# Patient Record
Sex: MALE | Race: WHITE | Employment: OTHER | URBAN - METROPOLITAN AREA
[De-identification: names, ages, dates, MRNs, and addresses within clinical notes are randomized per-mention and may not be internally consistent; named-entity substitution may affect disease eponyms.]

---

## 2018-02-01 ENCOUNTER — OFFICE VISIT (OUTPATIENT)
Dept: FAMILY MEDICINE CLINIC | Age: 63
End: 2018-02-01

## 2018-02-01 VITALS
TEMPERATURE: 98.4 F | DIASTOLIC BLOOD PRESSURE: 76 MMHG | BODY MASS INDEX: 29.93 KG/M2 | SYSTOLIC BLOOD PRESSURE: 122 MMHG | HEIGHT: 71 IN | WEIGHT: 213.8 LBS | OXYGEN SATURATION: 96 %

## 2018-02-01 DIAGNOSIS — J40 BRONCHITIS: Primary | ICD-10-CM

## 2018-02-01 PROCEDURE — G8419 CALC BMI OUT NRM PARAM NOF/U: HCPCS | Performed by: FAMILY MEDICINE

## 2018-02-01 PROCEDURE — 1036F TOBACCO NON-USER: CPT | Performed by: FAMILY MEDICINE

## 2018-02-01 PROCEDURE — 3017F COLORECTAL CA SCREEN DOC REV: CPT | Performed by: FAMILY MEDICINE

## 2018-02-01 PROCEDURE — G8427 DOCREV CUR MEDS BY ELIG CLIN: HCPCS | Performed by: FAMILY MEDICINE

## 2018-02-01 PROCEDURE — 99203 OFFICE O/P NEW LOW 30 MIN: CPT | Performed by: FAMILY MEDICINE

## 2018-02-01 PROCEDURE — G8484 FLU IMMUNIZE NO ADMIN: HCPCS | Performed by: FAMILY MEDICINE

## 2018-02-01 RX ORDER — DOXYCYCLINE HYCLATE 100 MG
100 TABLET ORAL 2 TIMES DAILY
Qty: 20 TABLET | Refills: 0 | Status: SHIPPED | OUTPATIENT
Start: 2018-02-01 | End: 2018-02-11

## 2018-02-01 RX ORDER — BENZONATATE 200 MG/1
200 CAPSULE ORAL 3 TIMES DAILY PRN
Qty: 30 CAPSULE | Refills: 0 | Status: SHIPPED | OUTPATIENT
Start: 2018-02-01 | End: 2018-02-08

## 2018-02-01 ASSESSMENT — ENCOUNTER SYMPTOMS
SHORTNESS OF BREATH: 0
COUGH: 1
ABDOMINAL PAIN: 0
SINUS PRESSURE: 0
VOMITING: 0
DIARRHEA: 0
RHINORRHEA: 0
EYE DISCHARGE: 0
SORE THROAT: 0
WHEEZING: 1
CONSTIPATION: 0
BLOOD IN STOOL: 0
EYE PAIN: 0
CHEST TIGHTNESS: 0

## 2018-02-01 NOTE — PROGRESS NOTES
Subjective:      Patient ID: Zelda Holstein is a 65 y.o. male. HPI  Chief Complaint   Patient presents with    New Patient     Patient is here to establish care with Dr. Caterina Todd as a new patient.  Cough     Patient presents with a cough today, symptoms began a few days ago, cough is productive some of the time. Here to establish care. Non smoker. Last saw doctor  Was over 20 years. Denies smoking  Has cough and wheezing x 1 week. No fever. + bodyaches. No flu shot. No otc meds  + prod colored sputum  Zelda Holstein is a 65 y.o. male with the following history as recorded in [employer]: There are no active problems to display for this patient. No current outpatient prescriptions on file. No current facility-administered medications for this visit. Allergies: Review of patient's allergies indicates no known allergies. History reviewed. No pertinent past medical history. History reviewed. No pertinent surgical history. Family History   Problem Relation Age of Onset    Diabetes Brother     Dementia Mother     Diabetes Maternal Grandfather     Diabetes Brother      Social History   Substance Use Topics    Smoking status: Former Smoker     Years: 20.00     Types: Cigarettes    Smokeless tobacco: Never Used    Alcohol use No     Vitals:    02/01/18 1356   BP: 122/76   Site: Left Arm   Position: Sitting   Temp: 98.4 °F (36.9 °C)   TempSrc: Oral   SpO2: 96%   Weight: 213 lb 12.8 oz (97 kg)   Height: 5' 11\" (1.803 m)     Body mass index is 29.82 kg/m². Wt Readings from Last 3 Encounters:   02/01/18 213 lb 12.8 oz (97 kg)   07/14/10 200 lb (90.7 kg)     BP Readings from Last 3 Encounters:   02/01/18 122/76   07/14/10 130/84        Review of Systems   Constitutional: Negative for chills, fatigue, fever and unexpected weight change. HENT: Negative for congestion, ear discharge, ear pain, hearing loss, postnasal drip, rhinorrhea, sinus pressure and sore throat.     Eyes: Negative for pain, (TESSALON) 200 MG capsule     Sig: Take 1 capsule by mouth 3 times daily as needed for Cough     Dispense:  30 capsule     Refill:  0

## 2018-02-12 ENCOUNTER — OFFICE VISIT (OUTPATIENT)
Dept: FAMILY MEDICINE CLINIC | Age: 63
End: 2018-02-12

## 2018-02-12 VITALS
OXYGEN SATURATION: 98 % | SYSTOLIC BLOOD PRESSURE: 122 MMHG | WEIGHT: 207.6 LBS | DIASTOLIC BLOOD PRESSURE: 86 MMHG | HEIGHT: 69 IN | HEART RATE: 72 BPM | BODY MASS INDEX: 30.75 KG/M2

## 2018-02-12 DIAGNOSIS — Z13.220 SCREENING, LIPID: ICD-10-CM

## 2018-02-12 DIAGNOSIS — Z23 NEED FOR DIPHTHERIA-TETANUS-PERTUSSIS (TDAP) VACCINE: ICD-10-CM

## 2018-02-12 DIAGNOSIS — Z13.1 SCREENING FOR DIABETES MELLITUS (DM): ICD-10-CM

## 2018-02-12 DIAGNOSIS — Z11.4 SCREENING FOR HIV WITHOUT PRESENCE OF RISK FACTORS: ICD-10-CM

## 2018-02-12 DIAGNOSIS — Z12.5 SCREENING PSA (PROSTATE SPECIFIC ANTIGEN): ICD-10-CM

## 2018-02-12 DIAGNOSIS — L72.9 SKIN CYST: ICD-10-CM

## 2018-02-12 DIAGNOSIS — Z11.59 ENCOUNTER FOR HEPATITIS C SCREENING TEST FOR LOW RISK PATIENT: ICD-10-CM

## 2018-02-12 DIAGNOSIS — Z12.11 SCREEN FOR COLON CANCER: ICD-10-CM

## 2018-02-12 DIAGNOSIS — Z87.891 PERSONAL HISTORY OF TOBACCO USE: ICD-10-CM

## 2018-02-12 DIAGNOSIS — Z00.00 ANNUAL PHYSICAL EXAM: Primary | ICD-10-CM

## 2018-02-12 LAB
BILIRUBIN, POC: NEGATIVE
BLOOD URINE, POC: NEGATIVE
CHOLESTEROL, TOTAL: 190 MG/DL (ref 0–199)
CLARITY, POC: NORMAL
COLOR, POC: NORMAL
GLUCOSE BLD-MCNC: 122 MG/DL (ref 70–99)
GLUCOSE URINE, POC: NEGATIVE
HDLC SERPL-MCNC: 34 MG/DL (ref 40–60)
KETONES, POC: NEGATIVE
LDL CHOLESTEROL CALCULATED: 123 MG/DL
LEUKOCYTE EST, POC: NEGATIVE
NITRITE, POC: NEGATIVE
PH, POC: 5.5
PROSTATE SPECIFIC ANTIGEN: 0.28 NG/ML (ref 0–4)
PROTEIN, POC: NEGATIVE
SPECIFIC GRAVITY, POC: 1.02
TRIGL SERPL-MCNC: 163 MG/DL (ref 0–150)
UROBILINOGEN, POC: 0.2
VLDLC SERPL CALC-MCNC: 33 MG/DL

## 2018-02-12 PROCEDURE — 90715 TDAP VACCINE 7 YRS/> IM: CPT | Performed by: FAMILY MEDICINE

## 2018-02-12 PROCEDURE — 99396 PREV VISIT EST AGE 40-64: CPT | Performed by: FAMILY MEDICINE

## 2018-02-12 PROCEDURE — 36415 COLL VENOUS BLD VENIPUNCTURE: CPT | Performed by: FAMILY MEDICINE

## 2018-02-12 PROCEDURE — 81002 URINALYSIS NONAUTO W/O SCOPE: CPT | Performed by: FAMILY MEDICINE

## 2018-02-12 PROCEDURE — G0296 VISIT TO DETERM LDCT ELIG: HCPCS | Performed by: FAMILY MEDICINE

## 2018-02-12 PROCEDURE — 90471 IMMUNIZATION ADMIN: CPT | Performed by: FAMILY MEDICINE

## 2018-02-12 ASSESSMENT — ENCOUNTER SYMPTOMS
EYE PAIN: 0
CHEST TIGHTNESS: 0
SHORTNESS OF BREATH: 0
EYE DISCHARGE: 0
DIARRHEA: 0
CONSTIPATION: 0
COLOR CHANGE: 0
ABDOMINAL PAIN: 0
SINUS PRESSURE: 0
WHEEZING: 0
VOMITING: 0
RHINORRHEA: 0
COUGH: 0
BLOOD IN STOOL: 0

## 2018-02-12 ASSESSMENT — PATIENT HEALTH QUESTIONNAIRE - PHQ9
SUM OF ALL RESPONSES TO PHQ QUESTIONS 1-9: 0
1. LITTLE INTEREST OR PLEASURE IN DOING THINGS: 0
SUM OF ALL RESPONSES TO PHQ9 QUESTIONS 1 & 2: 0
2. FEELING DOWN, DEPRESSED OR HOPELESS: 0

## 2018-02-13 LAB
HEPATITIS C ANTIBODY INTERPRETATION: REACTIVE
HIV AG/AB: NORMAL
HIV ANTIGEN: NORMAL
HIV-1 ANTIBODY: NORMAL
HIV-2 AB: NORMAL

## 2018-02-14 NOTE — PROGRESS NOTES
Patient was advised of results and voiced understanding. He is scheduled to return on Friday to discuss his results.
Leukocytes, UA negative     Nitrite, UA negative        Review of Systems   Constitutional: Negative for fatigue, fever and unexpected weight change. HENT: Negative for congestion, ear discharge, ear pain, hearing loss, rhinorrhea and sinus pressure. Eyes: Negative for pain, discharge and visual disturbance. Respiratory: Negative for cough, chest tightness, shortness of breath and wheezing. Cardiovascular: Negative for chest pain, palpitations and leg swelling. Gastrointestinal: Negative for abdominal pain, blood in stool, constipation, diarrhea and vomiting. Genitourinary: Negative for difficulty urinating, dysuria and hematuria. Musculoskeletal: Negative for arthralgias and myalgias. Skin: Negative for color change and rash. Neurological: Negative for dizziness, seizures, syncope and headaches. Hematological: Negative for adenopathy. Does not bruise/bleed easily. Psychiatric/Behavioral: Negative for dysphoric mood and sleep disturbance. The patient is not nervous/anxious. Objective:   Physical Exam   Constitutional: He is oriented to person, place, and time. He appears well-developed and well-nourished. No distress. HENT:   Head: Normocephalic. Right Ear: External ear normal.   Left Ear: External ear normal.   Nose: Nose normal.   Mouth/Throat: Oropharynx is clear and moist. No oropharyngeal exudate. Eyes: Conjunctivae and EOM are normal. Pupils are equal, round, and reactive to light. No scleral icterus. Neck: Normal range of motion. Neck supple. No thyromegaly present. Cardiovascular: Normal rate, regular rhythm, normal heart sounds and intact distal pulses. No murmur heard. Pulmonary/Chest: Effort normal and breath sounds normal. He has no wheezes. Abdominal: Soft. Bowel sounds are normal. He exhibits no distension. There is no tenderness. There is no rebound and no guarding. Genitourinary: Penis normal. No penile tenderness.    Genitourinary Comments: BETITO- full

## 2018-02-16 ENCOUNTER — OFFICE VISIT (OUTPATIENT)
Dept: FAMILY MEDICINE CLINIC | Age: 63
End: 2018-02-16

## 2018-02-16 VITALS
BODY MASS INDEX: 31.28 KG/M2 | DIASTOLIC BLOOD PRESSURE: 78 MMHG | HEIGHT: 69 IN | OXYGEN SATURATION: 97 % | HEART RATE: 78 BPM | SYSTOLIC BLOOD PRESSURE: 126 MMHG | WEIGHT: 211.2 LBS

## 2018-02-16 DIAGNOSIS — R76.8 HEPATITIS C ANTIBODY TEST POSITIVE: Primary | ICD-10-CM

## 2018-02-16 DIAGNOSIS — R73.9 HYPERGLYCEMIA: ICD-10-CM

## 2018-02-16 PROBLEM — Z87.891 FORMER SMOKER: Status: ACTIVE | Noted: 2018-02-16

## 2018-02-16 PROBLEM — Z00.00 ANNUAL PHYSICAL EXAM: Status: ACTIVE | Noted: 2018-02-16

## 2018-02-16 LAB
A/G RATIO: 1.3 (ref 1.1–2.2)
ALBUMIN SERPL-MCNC: 4 G/DL (ref 3.4–5)
ALP BLD-CCNC: 61 U/L (ref 40–129)
ALT SERPL-CCNC: 16 U/L (ref 10–40)
ANION GAP SERPL CALCULATED.3IONS-SCNC: 13 MMOL/L (ref 3–16)
AST SERPL-CCNC: 16 U/L (ref 15–37)
BASOPHILS ABSOLUTE: 0.1 K/UL (ref 0–0.2)
BASOPHILS RELATIVE PERCENT: 0.7 %
BILIRUB SERPL-MCNC: 0.6 MG/DL (ref 0–1)
BUN BLDV-MCNC: 16 MG/DL (ref 7–20)
CALCIUM SERPL-MCNC: 9.2 MG/DL (ref 8.3–10.6)
CHLORIDE BLD-SCNC: 104 MMOL/L (ref 99–110)
CO2: 25 MMOL/L (ref 21–32)
CREAT SERPL-MCNC: 1 MG/DL (ref 0.8–1.3)
EOSINOPHILS ABSOLUTE: 0.5 K/UL (ref 0–0.6)
EOSINOPHILS RELATIVE PERCENT: 4.9 %
GFR AFRICAN AMERICAN: >60
GFR NON-AFRICAN AMERICAN: >60
GLOBULIN: 3.2 G/DL
GLUCOSE BLD-MCNC: 123 MG/DL (ref 70–99)
HCT VFR BLD CALC: 48.8 % (ref 40.5–52.5)
HEMOGLOBIN: 16.4 G/DL (ref 13.5–17.5)
LYMPHOCYTES ABSOLUTE: 3.3 K/UL (ref 1–5.1)
LYMPHOCYTES RELATIVE PERCENT: 33.2 %
MCH RBC QN AUTO: 30.4 PG (ref 26–34)
MCHC RBC AUTO-ENTMCNC: 33.6 G/DL (ref 31–36)
MCV RBC AUTO: 90.4 FL (ref 80–100)
MONOCYTES ABSOLUTE: 1.3 K/UL (ref 0–1.3)
MONOCYTES RELATIVE PERCENT: 13.7 %
NEUTROPHILS ABSOLUTE: 4.7 K/UL (ref 1.7–7.7)
NEUTROPHILS RELATIVE PERCENT: 47.5 %
PDW BLD-RTO: 13.9 % (ref 12.4–15.4)
PLATELET # BLD: 282 K/UL (ref 135–450)
PMV BLD AUTO: 11.1 FL (ref 5–10.5)
POTASSIUM SERPL-SCNC: 4.9 MMOL/L (ref 3.5–5.1)
RBC # BLD: 5.4 M/UL (ref 4.2–5.9)
SODIUM BLD-SCNC: 142 MMOL/L (ref 136–145)
TOTAL PROTEIN: 7.2 G/DL (ref 6.4–8.2)
TSH REFLEX: 0.86 UIU/ML (ref 0.27–4.2)
WBC # BLD: 9.9 K/UL (ref 4–11)

## 2018-02-16 PROCEDURE — 36415 COLL VENOUS BLD VENIPUNCTURE: CPT | Performed by: FAMILY MEDICINE

## 2018-02-16 PROCEDURE — G8427 DOCREV CUR MEDS BY ELIG CLIN: HCPCS | Performed by: FAMILY MEDICINE

## 2018-02-16 PROCEDURE — 99213 OFFICE O/P EST LOW 20 MIN: CPT | Performed by: FAMILY MEDICINE

## 2018-02-16 PROCEDURE — G8484 FLU IMMUNIZE NO ADMIN: HCPCS | Performed by: FAMILY MEDICINE

## 2018-02-16 PROCEDURE — G8417 CALC BMI ABV UP PARAM F/U: HCPCS | Performed by: FAMILY MEDICINE

## 2018-02-16 PROCEDURE — 3017F COLORECTAL CA SCREEN DOC REV: CPT | Performed by: FAMILY MEDICINE

## 2018-02-16 PROCEDURE — 1036F TOBACCO NON-USER: CPT | Performed by: FAMILY MEDICINE

## 2018-02-16 NOTE — PATIENT INSTRUCTIONS

## 2018-02-16 NOTE — PROGRESS NOTES
Subjective:      Patient ID: Kodi Dominguez is a 65 y.o. male. HPI   Chief Complaint   Patient presents with   [facility]     Patient is here to discuss his most recent test results. Here for f/u  Family h/o DM  Does eat a lot of pasta and rice  Denies excessive sugar  No known risk factory for hep c but sister in law had it  Vitals:    02/16/18 0806   BP: 126/78   Pulse: 78   SpO2: 97%   Weight: 211 lb 3.2 oz (95.8 kg)   Height: 5' 8.5\" (1.74 m)     Body mass index is 31.65 kg/m². Wt Readings from Last 3 Encounters:   02/16/18 211 lb 3.2 oz (95.8 kg)   02/12/18 207 lb 9.6 oz (94.2 kg)   02/01/18 213 lb 12.8 oz (97 kg)     BP Readings from Last 3 Encounters:   02/16/18 126/78   02/12/18 122/86   02/01/18 122/76            Lab Review   Office Visit on 02/12/2018   Component Date Value    Glucose, UA POC 02/12/2018 negative     Bilirubin, UA 02/12/2018 negative     Ketones, UA 02/12/2018 negative     Spec Grav, UA 02/12/2018 1.020     Blood, UA POC 02/12/2018 negative     pH, UA 02/12/2018 5.5     Protein, UA POC 02/12/2018 negative     Urobilinogen, UA 02/12/2018 0.2     Leukocytes, UA 02/12/2018 negative     Nitrite, UA 02/12/2018 negative     Cholesterol, Total 02/12/2018 190     Triglycerides 02/12/2018 163*    HDL 02/12/2018 34*    LDL Calculated 02/12/2018 123*    VLDL Cholesterol Calcula* 02/12/2018 33     PSA 02/12/2018 0.28     Glucose 02/12/2018 122*    Hep C Ab Interp 02/13/2018 REACTIVE*    HIV Ag/Ab 02/13/2018 Non-Reactive     HIV-1 Antibody 02/13/2018 Non-Reactive     HIV ANTIGEN 02/13/2018 Non-Reactive     HIV-2 Ab 02/13/2018 Non-Reactive        Review of Systems   All other systems reviewed and are negative. Objective:   Physical Exam   Constitutional: He is oriented to person, place, and time. He appears well-developed and well-nourished. No distress. HENT:   Head: Normocephalic. Mouth/Throat: Oropharynx is clear and moist. No oropharyngeal exudate.    Eyes: EOM are normal.   Neck: Neck supple. Cardiovascular: Normal rate, regular rhythm, normal heart sounds and intact distal pulses. No murmur heard. Pulmonary/Chest: Effort normal and breath sounds normal. He has no wheezes. Abdominal: Soft. Bowel sounds are normal. He exhibits no distension. There is no tenderness. There is no rebound and no guarding. Neurological: He is oriented to person, place, and time. Skin: Skin is warm.    Psychiatric: His behavior is normal.       Assessment:      Hyperglycemia- check a1c, tsh, cbc  Hep c + - check addtl labs      Plan:       Reviewed labs w/ pt  Diet and exercise    Orders Placed This Encounter   Procedures    HEMOGLOBIN A1C    HEPATITIS C RNA, QUANTITATIVE, PCR    COMPREHENSIVE METABOLIC PANEL    TSH with Reflex    CBC WITH AUTO DIFFERENTIAL

## 2018-02-17 LAB
ESTIMATED AVERAGE GLUCOSE: 128.4 MG/DL
HBA1C MFR BLD: 6.1 %

## 2018-02-21 LAB
EER HCV RNA QNT PCR: NORMAL
HCV RNA QNT REAL-TIME PCR INTERP: NOT DETECTED
HCV RNA, QUANTITATIVE REAL TIME PCR: <1.2 LOG IU
HEPATITIS C RNA PCR QUANT: <15 IU/ML

## 2018-03-01 ENCOUNTER — OFFICE VISIT (OUTPATIENT)
Dept: FAMILY MEDICINE CLINIC | Age: 63
End: 2018-03-01

## 2018-03-01 VITALS
DIASTOLIC BLOOD PRESSURE: 80 MMHG | WEIGHT: 216.4 LBS | HEIGHT: 69 IN | SYSTOLIC BLOOD PRESSURE: 132 MMHG | BODY MASS INDEX: 32.05 KG/M2 | OXYGEN SATURATION: 97 % | HEART RATE: 70 BPM

## 2018-03-01 DIAGNOSIS — R76.8 HEPATITIS C ANTIBODY TEST POSITIVE: ICD-10-CM

## 2018-03-01 DIAGNOSIS — R73.03 PREDIABETES: Primary | ICD-10-CM

## 2018-03-01 PROCEDURE — 1036F TOBACCO NON-USER: CPT | Performed by: FAMILY MEDICINE

## 2018-03-01 PROCEDURE — 3017F COLORECTAL CA SCREEN DOC REV: CPT | Performed by: FAMILY MEDICINE

## 2018-03-01 PROCEDURE — G8484 FLU IMMUNIZE NO ADMIN: HCPCS | Performed by: FAMILY MEDICINE

## 2018-03-01 PROCEDURE — G8417 CALC BMI ABV UP PARAM F/U: HCPCS | Performed by: FAMILY MEDICINE

## 2018-03-01 PROCEDURE — 99213 OFFICE O/P EST LOW 20 MIN: CPT | Performed by: FAMILY MEDICINE

## 2018-03-01 PROCEDURE — G8427 DOCREV CUR MEDS BY ELIG CLIN: HCPCS | Performed by: FAMILY MEDICINE

## 2018-03-01 NOTE — PROGRESS NOTES
Subjective:      Patient ID: Kodi Dominguez is a 65 y.o. male. HPI  Chief Complaint   Patient presents with   [employer]     Patient is here to discuss his most recent labs with Dr. Olya Velásquez. Here for follow-up on labs. Patient still to schedule CT lung screen and colonoscopy. States would like to try diet and exercise to lower blood sugar. Vitals:    03/01/18 0934   BP: 132/80   Site: Right Arm   Pulse: 70   SpO2: 97%   Weight: 216 lb 6.4 oz (98.2 kg)   Height: 5' 8.5\" (1.74 m)     Body mass index is 32.42 kg/m².      Wt Readings from Last 3 Encounters:   03/01/18 216 lb 6.4 oz (98.2 kg)   02/16/18 211 lb 3.2 oz (95.8 kg)   02/12/18 207 lb 9.6 oz (94.2 kg)     BP Readings from Last 3 Encounters:   03/01/18 132/80   02/16/18 126/78   02/12/18 122/86      Lab Review   Office Visit on 02/16/2018   Component Date Value    Hemoglobin A1C 02/16/2018 6.1     eAG 02/16/2018 128.4     HCV RNA, Quantitative Re* 02/16/2018 <1.2     HCV RNA PCR, Quant 02/16/2018 <15     EER HCV RNA Quant, PCR 02/16/2018 See Note     HCV RNA Qnt Real-Time PC* 02/16/2018 Not Detected     Sodium 02/16/2018 142     Potassium 02/16/2018 4.9     Chloride 02/16/2018 104     CO2 02/16/2018 25     Anion Gap 02/16/2018 13     Glucose 02/16/2018 123*    BUN 02/16/2018 16     CREATININE 02/16/2018 1.0     GFR Non- 02/16/2018 >60     GFR  02/16/2018 >60     Calcium 02/16/2018 9.2     Total Protein 02/16/2018 7.2     Alb 02/16/2018 4.0     Albumin/Globulin Ratio 02/16/2018 1.3     Total Bilirubin 02/16/2018 0.6     Alkaline Phosphatase 02/16/2018 61     ALT 02/16/2018 16     AST 02/16/2018 16     Globulin 02/16/2018 3.2     TSH 02/16/2018 0.86     WBC 02/16/2018 9.9     RBC 02/16/2018 5.40     Hemoglobin 02/16/2018 16.4     Hematocrit 02/16/2018 48.8     MCV 02/16/2018 90.4     MCH 02/16/2018 30.4     MCHC 02/16/2018 33.6     RDW 02/16/2018 13.9     Platelets 61/20/9718 282     MPV 02/16/2018 11.1*    Neutrophils % 02/16/2018 47.5     Lymphocytes % 02/16/2018 33.2     Monocytes % 02/16/2018 13.7     Eosinophils % 02/16/2018 4.9     Basophils % 02/16/2018 0.7     Neutrophils # 02/16/2018 4.7     Lymphocytes # 02/16/2018 3.3     Monocytes # 02/16/2018 1.3     Eosinophils # 02/16/2018 0.5     Basophils # 02/16/2018 0.1    Office Visit on 02/12/2018   Component Date Value    Glucose, UA POC 02/12/2018 negative     Bilirubin, UA 02/12/2018 negative     Ketones, UA 02/12/2018 negative     Spec Grav, UA 02/12/2018 1.020     Blood, UA POC 02/12/2018 negative     pH, UA 02/12/2018 5.5     Protein, UA POC 02/12/2018 negative     Urobilinogen, UA 02/12/2018 0.2     Leukocytes, UA 02/12/2018 negative     Nitrite, UA 02/12/2018 negative     Cholesterol, Total 02/12/2018 190     Triglycerides 02/12/2018 163*    HDL 02/12/2018 34*    LDL Calculated 02/12/2018 123*    VLDL Cholesterol Calcula* 02/12/2018 33     PSA 02/12/2018 0.28     Glucose 02/12/2018 122*    Hep C Ab Interp 02/12/2018 REACTIVE*    HIV Ag/Ab 02/12/2018 Non-Reactive     HIV-1 Antibody 02/12/2018 Non-Reactive     HIV ANTIGEN 02/12/2018 Non-Reactive     HIV-2 Ab 02/12/2018 Non-Reactive        Review of Systems   All other systems reviewed and are negative. Objective:   Physical Exam   Constitutional: He is oriented to person, place, and time. He appears well-developed and well-nourished. No distress. HENT:   Head: Normocephalic. Mouth/Throat: Oropharynx is clear and moist. No oropharyngeal exudate. Neck: Neck supple. Cardiovascular: Normal rate, regular rhythm, normal heart sounds and intact distal pulses. No murmur heard. Pulmonary/Chest: Effort normal and breath sounds normal. He has no wheezes. Abdominal: Soft. Bowel sounds are normal. He exhibits no distension. There is no tenderness. There is no rebound and no guarding. Neurological: He is oriented to person, place, and time.    Skin:

## 2018-04-11 PROBLEM — Z00.00 ANNUAL PHYSICAL EXAM: Status: RESOLVED | Noted: 2018-02-16 | Resolved: 2018-04-11

## 2018-04-24 ENCOUNTER — HOSPITAL ENCOUNTER (OUTPATIENT)
Dept: DIABETES SERVICES | Age: 63
Discharge: HOME | End: 2018-04-30
Admitting: FAMILY MEDICINE

## 2018-04-24 ENCOUNTER — HOSPITAL ENCOUNTER (OUTPATIENT)
Dept: OTHER | Age: 63
Discharge: HOME | End: 2018-04-24
Attending: FAMILY MEDICINE | Admitting: FAMILY MEDICINE

## 2018-05-01 ENCOUNTER — HOSPITAL ENCOUNTER (OUTPATIENT)
Dept: OTHER | Age: 63
Discharge: HOME | End: 2018-05-31
Attending: FAMILY MEDICINE | Admitting: FAMILY MEDICINE

## 2018-09-18 ENCOUNTER — OFFICE VISIT (OUTPATIENT)
Dept: FAMILY MEDICINE CLINIC | Age: 63
End: 2018-09-18

## 2018-09-18 VITALS
OXYGEN SATURATION: 97 % | DIASTOLIC BLOOD PRESSURE: 82 MMHG | HEART RATE: 62 BPM | SYSTOLIC BLOOD PRESSURE: 128 MMHG | WEIGHT: 213 LBS | HEIGHT: 69 IN | BODY MASS INDEX: 31.55 KG/M2

## 2018-09-18 DIAGNOSIS — R76.8 HEPATITIS C ANTIBODY TEST POSITIVE: ICD-10-CM

## 2018-09-18 DIAGNOSIS — R73.03 PREDIABETES: ICD-10-CM

## 2018-09-18 DIAGNOSIS — R53.1 RIGHT SIDED WEAKNESS: Primary | ICD-10-CM

## 2018-09-18 PROCEDURE — G8417 CALC BMI ABV UP PARAM F/U: HCPCS | Performed by: FAMILY MEDICINE

## 2018-09-18 PROCEDURE — 3017F COLORECTAL CA SCREEN DOC REV: CPT | Performed by: FAMILY MEDICINE

## 2018-09-18 PROCEDURE — 99214 OFFICE O/P EST MOD 30 MIN: CPT | Performed by: FAMILY MEDICINE

## 2018-09-18 PROCEDURE — 1036F TOBACCO NON-USER: CPT | Performed by: FAMILY MEDICINE

## 2018-09-18 PROCEDURE — G8427 DOCREV CUR MEDS BY ELIG CLIN: HCPCS | Performed by: FAMILY MEDICINE

## 2018-09-18 PROCEDURE — 36415 COLL VENOUS BLD VENIPUNCTURE: CPT | Performed by: FAMILY MEDICINE

## 2018-09-18 ASSESSMENT — ENCOUNTER SYMPTOMS
SINUS PRESSURE: 0
ABDOMINAL PAIN: 0
EYE DISCHARGE: 0
EYE PAIN: 0
VOMITING: 0
BLOOD IN STOOL: 0
WHEEZING: 0
CONSTIPATION: 0
SHORTNESS OF BREATH: 0
CHEST TIGHTNESS: 0
COUGH: 0
RHINORRHEA: 0
DIARRHEA: 0

## 2018-09-19 ENCOUNTER — TELEPHONE (OUTPATIENT)
Dept: FAMILY MEDICINE CLINIC | Age: 63
End: 2018-09-19

## 2018-09-19 DIAGNOSIS — G45.9 TRANSIENT CEREBRAL ISCHEMIA, UNSPECIFIED TYPE: Primary | ICD-10-CM

## 2018-09-19 LAB
ESTIMATED AVERAGE GLUCOSE: 119.8 MG/DL
HBA1C MFR BLD: 5.8 %

## 2018-09-20 LAB
HCV QNT BY NAAT IU/ML: NOT DETECTED IU/ML
HCV QNT BY NAAT LOG IU/ML: NOT DETECTED LOG IU/ML

## 2018-09-25 ENCOUNTER — HOSPITAL ENCOUNTER (OUTPATIENT)
Age: 63
Discharge: HOME | End: 2018-09-25
Payer: COMMERCIAL

## 2018-09-25 ENCOUNTER — HOSPITAL ENCOUNTER (OUTPATIENT)
Dept: MRI IMAGING | Age: 63
Discharge: HOME | End: 2018-09-25
Payer: COMMERCIAL

## 2018-09-25 DIAGNOSIS — R53.1 RIGHT SIDED WEAKNESS: ICD-10-CM

## 2018-09-25 DIAGNOSIS — G45.9 TRANSIENT CEREBRAL ISCHEMIA, UNSPECIFIED TYPE: ICD-10-CM

## 2018-09-25 LAB
ANION GAP SERPL CALCULATED.3IONS-SCNC: 8 MMOL/L (ref 3–16)
BUN BLDV-MCNC: 18 MG/DL (ref 7–20)
CALCIUM SERPL-MCNC: 9 MG/DL (ref 8.3–10.6)
CHLORIDE BLD-SCNC: 103 MMOL/L (ref 99–110)
CO2: 30 MMOL/L (ref 21–32)
CREAT SERPL-MCNC: 1.1 MG/DL (ref 0.8–1.3)
GFR AFRICAN AMERICAN: >60
GFR NON-AFRICAN AMERICAN: >60
GLUCOSE BLD-MCNC: 100 MG/DL (ref 70–99)
POTASSIUM SERPL-SCNC: 4.7 MMOL/L (ref 3.5–5.1)
SODIUM BLD-SCNC: 141 MMOL/L (ref 136–145)

## 2018-09-25 PROCEDURE — 6360000004 HC RX CONTRAST MEDICATION: Performed by: FAMILY MEDICINE

## 2018-09-25 PROCEDURE — 80048 BASIC METABOLIC PNL TOTAL CA: CPT

## 2018-09-25 PROCEDURE — A9579 GAD-BASE MR CONTRAST NOS,1ML: HCPCS | Performed by: FAMILY MEDICINE

## 2018-09-25 PROCEDURE — 70553 MRI BRAIN STEM W/O & W/DYE: CPT

## 2018-09-25 PROCEDURE — 36415 COLL VENOUS BLD VENIPUNCTURE: CPT

## 2018-09-25 RX ADMIN — GADOTERIDOL 18 ML: 279.3 INJECTION, SOLUTION INTRAVENOUS at 09:20

## 2018-09-28 ENCOUNTER — OFFICE VISIT (OUTPATIENT)
Dept: FAMILY MEDICINE CLINIC | Age: 63
End: 2018-09-28
Payer: COMMERCIAL

## 2018-09-28 VITALS
WEIGHT: 213 LBS | HEART RATE: 90 BPM | DIASTOLIC BLOOD PRESSURE: 82 MMHG | SYSTOLIC BLOOD PRESSURE: 126 MMHG | OXYGEN SATURATION: 97 % | HEIGHT: 69 IN | BODY MASS INDEX: 31.55 KG/M2

## 2018-09-28 DIAGNOSIS — I63.9 CEREBROVASCULAR ACCIDENT (CVA), UNSPECIFIED MECHANISM (HCC): Primary | ICD-10-CM

## 2018-09-28 PROCEDURE — G8427 DOCREV CUR MEDS BY ELIG CLIN: HCPCS | Performed by: FAMILY MEDICINE

## 2018-09-28 PROCEDURE — G8417 CALC BMI ABV UP PARAM F/U: HCPCS | Performed by: FAMILY MEDICINE

## 2018-09-28 PROCEDURE — G8599 NO ASA/ANTIPLAT THER USE RNG: HCPCS | Performed by: FAMILY MEDICINE

## 2018-09-28 PROCEDURE — 93000 ELECTROCARDIOGRAM COMPLETE: CPT | Performed by: FAMILY MEDICINE

## 2018-09-28 PROCEDURE — 3017F COLORECTAL CA SCREEN DOC REV: CPT | Performed by: FAMILY MEDICINE

## 2018-09-28 PROCEDURE — 99214 OFFICE O/P EST MOD 30 MIN: CPT | Performed by: FAMILY MEDICINE

## 2018-09-28 PROCEDURE — 1036F TOBACCO NON-USER: CPT | Performed by: FAMILY MEDICINE

## 2018-09-28 RX ORDER — ATORVASTATIN CALCIUM 10 MG/1
10 TABLET, FILM COATED ORAL NIGHTLY
Qty: 30 TABLET | Refills: 3 | Status: SHIPPED | OUTPATIENT
Start: 2018-09-28 | End: 2020-02-17

## 2018-09-28 ASSESSMENT — ENCOUNTER SYMPTOMS
BLOOD IN STOOL: 0
CHEST TIGHTNESS: 0
VOMITING: 0
DIARRHEA: 0
WHEEZING: 0
SHORTNESS OF BREATH: 0
EYE PAIN: 0
EYE DISCHARGE: 0
RHINORRHEA: 0
SINUS PRESSURE: 0
ABDOMINAL PAIN: 0
CONSTIPATION: 0
COUGH: 0

## 2018-09-28 NOTE — PROGRESS NOTES
portion of the orbits demonstrate no acute abnormality.       SINUSES: There is a small left mastoid effusion.  The paranasal sinuses are   clear.       BONES/SOFT TISSUES: The bone marrow signal intensity appears normal. The soft   tissues demonstrate no acute abnormality.           Impression   Small acute to subacute punctate infarct within the left frontoparietal lobe. Mild enhancement is identified at this level compatible with granulation   tissue.       Mild chronic small vessel ischemic disease within the periventricular white   matter of both cerebral hemispheres. Lab Review         Review of Systems   Constitutional: Negative for fatigue, fever and unexpected weight change. HENT: Negative for congestion, ear discharge, ear pain, hearing loss, rhinorrhea and sinus pressure. Eyes: Negative for pain, discharge and visual disturbance. Respiratory: Negative for cough, chest tightness, shortness of breath and wheezing. Cardiovascular: Negative for chest pain, palpitations and leg swelling. Gastrointestinal: Negative for abdominal pain, blood in stool, constipation, diarrhea and vomiting. Genitourinary: Negative for difficulty urinating, dysuria and hematuria. Neurological: Negative for dizziness, seizures, syncope and headaches. Psychiatric/Behavioral: Negative for dysphoric mood and sleep disturbance. The patient is not nervous/anxious. Objective:   Physical Exam   Constitutional: He is oriented to person, place, and time. He appears well-developed and well-nourished. No distress. HENT:   Head: Normocephalic. Right Ear: External ear normal.   Left Ear: External ear normal.   Nose: Nose normal.   Mouth/Throat: Oropharynx is clear and moist. No oropharyngeal exudate. Eyes: Pupils are equal, round, and reactive to light. EOM are normal.   Neck: Neck supple. No thyromegaly present. Cardiovascular: Normal rate, regular rhythm, normal heart sounds and intact distal pulses.     No murmur heard. Pulmonary/Chest: Effort normal and breath sounds normal. He has no wheezes. Abdominal: Soft. Bowel sounds are normal. He exhibits no distension. There is no tenderness. There is no rebound and no guarding. Musculoskeletal: Normal range of motion. Lymphadenopathy:     He has no cervical adenopathy. Neurological: He is alert and oriented to person, place, and time. Skin: Skin is warm. Psychiatric: He has a normal mood and affect. His behavior is normal. Judgment and thought content normal.       Assessment:      CVA- ekg- NSR, get addtl tests      Plan:      rec statin  rec BP control- not elevated her- will monitor at home to get trend  Baby ASA daily  Orders Placed This Encounter   Procedures    MRA HEAD WO CONTRAST     Standing Status:   Future     Standing Expiration Date:   9/28/2019     Order Specific Question:   Reason for exam:     Answer:   stroke    MRA NECK W WO CONTRAST     Standing Status:   Future     Standing Expiration Date:   9/28/2019     Order Specific Question:   Reason for exam:     Answer:   stroke    EKG 12 Lead     Order Specific Question:   Reason for Exam?     Answer:    Other    ECHO Complete 2D W Doppler W Color     Standing Status:   Future     Standing Expiration Date:   11/28/2018     Order Specific Question:   Reason for exam:     Answer:   stroke     Orders Placed This Encounter   Medications    atorvastatin (LIPITOR) 10 MG tablet     Sig: Take 1 tablet by mouth nightly     Dispense:  30 tablet     Refill:  3             CHRISTOPHER OSCAR DO

## 2019-12-05 ENCOUNTER — OFFICE VISIT (OUTPATIENT)
Dept: FAMILY MEDICINE CLINIC | Age: 64
End: 2019-12-05
Payer: COMMERCIAL

## 2019-12-05 VITALS
HEIGHT: 69 IN | TEMPERATURE: 98.7 F | BODY MASS INDEX: 32.23 KG/M2 | WEIGHT: 217.6 LBS | OXYGEN SATURATION: 98 % | HEART RATE: 75 BPM | SYSTOLIC BLOOD PRESSURE: 122 MMHG | DIASTOLIC BLOOD PRESSURE: 78 MMHG

## 2019-12-05 DIAGNOSIS — J40 BRONCHITIS: Primary | ICD-10-CM

## 2019-12-05 PROCEDURE — G8417 CALC BMI ABV UP PARAM F/U: HCPCS | Performed by: FAMILY MEDICINE

## 2019-12-05 PROCEDURE — G8484 FLU IMMUNIZE NO ADMIN: HCPCS | Performed by: FAMILY MEDICINE

## 2019-12-05 PROCEDURE — 99213 OFFICE O/P EST LOW 20 MIN: CPT | Performed by: FAMILY MEDICINE

## 2019-12-05 PROCEDURE — 3017F COLORECTAL CA SCREEN DOC REV: CPT | Performed by: FAMILY MEDICINE

## 2019-12-05 PROCEDURE — 1036F TOBACCO NON-USER: CPT | Performed by: FAMILY MEDICINE

## 2019-12-05 PROCEDURE — G8427 DOCREV CUR MEDS BY ELIG CLIN: HCPCS | Performed by: FAMILY MEDICINE

## 2019-12-05 RX ORDER — DOXYCYCLINE HYCLATE 100 MG
100 TABLET ORAL 2 TIMES DAILY
Qty: 20 TABLET | Refills: 0 | Status: SHIPPED | OUTPATIENT
Start: 2019-12-05 | End: 2019-12-15

## 2019-12-05 ASSESSMENT — ENCOUNTER SYMPTOMS
SINUS PRESSURE: 1
SHORTNESS OF BREATH: 0
SORE THROAT: 1
CHEST TIGHTNESS: 0
RHINORRHEA: 1
COUGH: 1
WHEEZING: 1

## 2019-12-05 ASSESSMENT — PATIENT HEALTH QUESTIONNAIRE - PHQ9
SUM OF ALL RESPONSES TO PHQ9 QUESTIONS 1 & 2: 0
SUM OF ALL RESPONSES TO PHQ QUESTIONS 1-9: 0
SUM OF ALL RESPONSES TO PHQ QUESTIONS 1-9: 0
2. FEELING DOWN, DEPRESSED OR HOPELESS: 0
1. LITTLE INTEREST OR PLEASURE IN DOING THINGS: 0

## 2019-12-18 NOTE — PATIENT INSTRUCTIONS
[facility]  [facility]  [address]  [place] [address]      Juan Carlos Beckwith :1983 MRN:[id number]    2019 Time Session Began: 1232  Time Session Ended: 1330    Session Type:60 Minute Therapy (23265)    Others Present: no    Intervention: Behavioral, Cognitive, Insight, Brainspotting    Suicide/Homicide/Violence Ideation: No    If Yes, explain:     Current Outpatient Medications   Medication Sig   • cloNIDine (CATAPRES-TTS 2) 0.2 MG/24HR Place 1 patch onto the skin 1 day a week. Box includes patch and non-medicated adhesive cover. Apply adhesive cover if patch begins to lift.   • clonazePAM (KLONOPIN) 1 MG tablet Take 0.5-1 tablets by mouth 2 times daily as needed (severe anxiety, panic).   • zolpidem (AMBIEN) 5 MG tablet Take 1 tablet by mouth nightly as needed for Sleep.   • cloNIDine (CATAPRES) 0.1 MG tablet Take 2 tablets by mouth at bedtime AND 1 tablet daily. May also take 1 tablet 3 times daily as needed (anxiety, anger, PTSD).   • ibuprofen (MOTRIN) 600 MG tablet Take 600 mg by mouth every 6 hours.     No current facility-administered medications for this visit.        Change in Medication(s) Reported: No  If Yes, explain:     Patient/Family Education Provided: Yes  Patient/Family Displays Understanding: Yes    If No, explain:     Chief complaint in patient's own words: \"I have difficulty trusting others\"    Progress Note containing chief complaint and symptoms/problems related to the complaint:    (Data/Action/Response/Plan)    D:  Client presented on time. CT reported feeling frustrated with stressors such as work and has recorded his heart rate under new fitness monitor.  Client reported more difficulty sleeping and higher heart rates on nights prior to work.  Client reported reactions from stopping PTSD process group and conversation with Dr. Meg Loyola.     A: Writer provided support and empathy.  Explored client's reaction  Please call your pharmacy if you need any refills of your medication(s). Please call our office at [phone]  if you don't hear from us about your test results. Please be sure to call our office if your illness/problem has been treated for but has not completely resolved. Bring an accurate list of your medications with you at every appointment to ensure that we have the correct information.     Our office hours are: Monday - Friday 7 am- 5 pm to his conversation with Dr. Meg Loyola.  Explored client's emotions of guilt and urge to make the best decision for himself.  Discussed process of noticing guilt but not reacting to it.  Discussed difficulty for client to complete value worksheet and how client states difficulty identifying his values contributes his difficulty making decisions and feeling lost.  Encouraged client to complete value worksheet and discussed barriers for completing.    Previous Assessment Measures: 4/1/19  MARIIA-7 (Anxiety): 20  PHQ-9 (Depression): 18  PCL-5 (PTSD): 47  MIES (Moral Injury Event Scale): 38  Q-LES-Q-SF (Quality of Life Event and Satisfaction): 37%    7/30/19  PHQ score: 13  MARIIA score: 16  PCL-5 score: 41    Goal #3: Discharge Planning How will you know that you don't need to come to therapy anymore?    Ability to socialize (go to sporting events) without reliance on alcohol or drugs to mitigate hypervigilance  Resuming employment for and maintaining for 1 year  Improving ability to communicate assertively with others  Quitting smoking cigarettes     Treatment plan- Address trauma with Adaptive disclosure, relaxation training, and mindfulness skills to practice diaphragmatic breathing, journal/write about previous losses/moral injuries, develop emotional tolerance (guilt, shame, fear, sadness), and increasing social activities.     R: The client was engaged throughout the session.  CT’s mood was depressed, affect somewhat constricted. Ct stated understanding of material discussed. CT agreed with plan to continue individual therapy appointments, start the communication and  identity group, and continue emotional intelligence and mind fullness.  Client stated intent to complete value worksheet for next session    P: Follow up in 1 week. Will continue brainspotting in January.     Need for Community Resources Assessed: Yes    Resources Needed: Yes    If Yes, what resources:  support group    Diagnosis:  F43.10 PTSD (post-traumatic stress disorder)  (primary encounter diagnosis)    Treatment Plan: Unchanged    Discharge Plan: N/A    Next Appointment: 1 week      Braulio David PSYD

## 2020-02-17 ENCOUNTER — OFFICE VISIT (OUTPATIENT)
Dept: FAMILY MEDICINE CLINIC | Age: 65
End: 2020-02-17
Payer: COMMERCIAL

## 2020-02-17 VITALS
BODY MASS INDEX: 31.78 KG/M2 | HEIGHT: 70 IN | HEART RATE: 81 BPM | WEIGHT: 222 LBS | SYSTOLIC BLOOD PRESSURE: 100 MMHG | TEMPERATURE: 98.8 F | OXYGEN SATURATION: 98 % | DIASTOLIC BLOOD PRESSURE: 62 MMHG

## 2020-02-17 LAB
INFLUENZA A ANTIGEN, POC: NEGATIVE
INFLUENZA B ANTIGEN, POC: NEGATIVE

## 2020-02-17 PROCEDURE — 1036F TOBACCO NON-USER: CPT | Performed by: FAMILY MEDICINE

## 2020-02-17 PROCEDURE — 99213 OFFICE O/P EST LOW 20 MIN: CPT | Performed by: FAMILY MEDICINE

## 2020-02-17 PROCEDURE — G8427 DOCREV CUR MEDS BY ELIG CLIN: HCPCS | Performed by: FAMILY MEDICINE

## 2020-02-17 PROCEDURE — 3017F COLORECTAL CA SCREEN DOC REV: CPT | Performed by: FAMILY MEDICINE

## 2020-02-17 PROCEDURE — G8484 FLU IMMUNIZE NO ADMIN: HCPCS | Performed by: FAMILY MEDICINE

## 2020-02-17 PROCEDURE — 87804 INFLUENZA ASSAY W/OPTIC: CPT | Performed by: FAMILY MEDICINE

## 2020-02-17 PROCEDURE — G8417 CALC BMI ABV UP PARAM F/U: HCPCS | Performed by: FAMILY MEDICINE

## 2020-02-17 RX ORDER — BENZONATATE 200 MG/1
200 CAPSULE ORAL 3 TIMES DAILY PRN
Qty: 30 CAPSULE | Refills: 0 | Status: SHIPPED | OUTPATIENT
Start: 2020-02-17 | End: 2020-02-24

## 2020-02-17 ASSESSMENT — PATIENT HEALTH QUESTIONNAIRE - PHQ9
SUM OF ALL RESPONSES TO PHQ9 QUESTIONS 1 & 2: 0
SUM OF ALL RESPONSES TO PHQ QUESTIONS 1-9: 0
2. FEELING DOWN, DEPRESSED OR HOPELESS: 0
1. LITTLE INTEREST OR PLEASURE IN DOING THINGS: 0
SUM OF ALL RESPONSES TO PHQ QUESTIONS 1-9: 0

## 2020-02-18 ASSESSMENT — ENCOUNTER SYMPTOMS
SHORTNESS OF BREATH: 0
SINUS PRESSURE: 0
WHEEZING: 0
CHEST TIGHTNESS: 0
SORE THROAT: 0
RHINORRHEA: 0
COUGH: 1

## 2020-02-18 NOTE — PATIENT INSTRUCTIONS

## 2020-12-16 ENCOUNTER — OFFICE VISIT (OUTPATIENT)
Dept: PRIMARY CARE CLINIC | Age: 65
End: 2020-12-16
Payer: COMMERCIAL

## 2020-12-16 PROCEDURE — G8417 CALC BMI ABV UP PARAM F/U: HCPCS | Performed by: NURSE PRACTITIONER

## 2020-12-16 PROCEDURE — G8428 CUR MEDS NOT DOCUMENT: HCPCS | Performed by: NURSE PRACTITIONER

## 2020-12-16 PROCEDURE — 99211 OFF/OP EST MAY X REQ PHY/QHP: CPT | Performed by: NURSE PRACTITIONER

## 2020-12-16 NOTE — PATIENT INSTRUCTIONS
Advance Care Planning  People with COVID-19 may have no symptoms, mild symptoms, such as fever, cough, and shortness of breath or they may have more severe illness, developing severe and fatal pneumonia. As a result, Advance Care Planning with attention to naming a health care decision maker (someone you trust to make healthcare decisions for you if you could not speak for yourself) and sharing other health care preferences is important BEFORE a possible health crisis. Please contact your Primary Care Provider to discuss Advance Care Planning. Preventing the Spread of Coronavirus Disease 2019 in Homes and Residential Communities  For the most recent information go to [email or web address]    Prevention steps for People with confirmed or suspected COVID-19 (including persons under investigation) who do not need to be hospitalized  and   People with confirmed COVID-19 who were hospitalized and determined to be medically stable to go home    Your healthcare provider and public health staff will evaluate whether you can be cared for at home. If it is determined that you do not need to be hospitalized and can be isolated at home, you will be monitored by staff from your local or state health department. You should follow the prevention steps below until a healthcare provider or local or state health department says you can return to your normal activities. Stay home except to get medical care  People who are mildly ill with COVID-19 are able to isolate at home during their illness. You should restrict activities outside your home, except for getting medical care. Do not go to work, school, or public areas. Avoid using public transportation, ride-sharing, or taxis.   Separate yourself from other people and animals in your home Wash your hands often with soap and water for at least 20 seconds, especially after blowing your nose, coughing, or sneezing; going to the bathroom; and before eating or preparing food. If soap and water are not readily available, use an alcohol-based hand  with at least 60% alcohol, covering all surfaces of your hands and rubbing them together until they feel dry. Soap and water are the best option if hands are visibly dirty. Avoid touching your eyes, nose, and mouth with unwashed hands. Avoid sharing personal household items  You should not share dishes, drinking glasses, cups, eating utensils, towels, or bedding with other people or pets in your home. After using these items, they should be washed thoroughly with soap and water. Clean all high-touch surfaces everyday  High touch surfaces include counters, tabletops, doorknobs, bathroom fixtures, toilets, phones, keyboards, tablets, and bedside tables. Also, clean any surfaces that may have blood, stool, or body fluids on them. Use a household cleaning spray or wipe, according to the label instructions. Labels contain instructions for safe and effective use of the cleaning product including precautions you should take when applying the product, such as wearing gloves and making sure you have good ventilation during use of the product.   Monitor your symptoms Seek prompt medical attention if your illness is worsening (e.g., difficulty breathing). Before seeking care, call your healthcare provider and tell them that you have, or are being evaluated for, COVID-19. Put on a facemask before you enter the facility. These steps will help the healthcare providers office to keep other people in the office or waiting room from getting infected or exposed. Ask your healthcare provider to call the local or state health department. Persons who are placed under active monitoring or facilitated self-monitoring should follow instructions provided by their local health department or occupational health professionals, as appropriate. When working with your local health department check their available hours. If you have a medical emergency and need to call 911, notify the dispatch personnel that you have, or are being evaluated for COVID-19. If possible, put on a facemask before emergency medical services arrive. Discontinuing home isolation  Patients with confirmed COVID-19 should remain under home isolation precautions until the risk of secondary transmission to others is thought to be low. The decision to discontinue home isolation precautions should be made on a case-by-case basis, in consultation with healthcare providers and state and local health departments.

## 2020-12-16 NOTE — PROGRESS NOTES
Nick Horton received a viral test for COVID-19. They were educated on isolation and quarantine as appropriate. For any symptoms, they were directed to seek care from their PCP, given contact information to establish with a doctor, directed to an urgent care or the emergency room.

## 2020-12-17 LAB — SARS-COV-2: NOT DETECTED

## 2021-09-27 ENCOUNTER — OFFICE VISIT (OUTPATIENT)
Dept: FAMILY MEDICINE CLINIC | Age: 66
End: 2021-09-27
Payer: MEDICARE

## 2021-09-27 VITALS
BODY MASS INDEX: 31.47 KG/M2 | OXYGEN SATURATION: 96 % | WEIGHT: 219.8 LBS | HEIGHT: 70 IN | RESPIRATION RATE: 16 BRPM | DIASTOLIC BLOOD PRESSURE: 84 MMHG | HEART RATE: 116 BPM | SYSTOLIC BLOOD PRESSURE: 118 MMHG

## 2021-09-27 DIAGNOSIS — R31.9 HEMATURIA, UNSPECIFIED TYPE: Primary | ICD-10-CM

## 2021-09-27 PROCEDURE — 4040F PNEUMOC VAC/ADMIN/RCVD: CPT | Performed by: FAMILY MEDICINE

## 2021-09-27 PROCEDURE — 3017F COLORECTAL CA SCREEN DOC REV: CPT | Performed by: FAMILY MEDICINE

## 2021-09-27 PROCEDURE — 1036F TOBACCO NON-USER: CPT | Performed by: FAMILY MEDICINE

## 2021-09-27 PROCEDURE — 99213 OFFICE O/P EST LOW 20 MIN: CPT | Performed by: FAMILY MEDICINE

## 2021-09-27 PROCEDURE — 1123F ACP DISCUSS/DSCN MKR DOCD: CPT | Performed by: FAMILY MEDICINE

## 2021-09-27 PROCEDURE — G8427 DOCREV CUR MEDS BY ELIG CLIN: HCPCS | Performed by: FAMILY MEDICINE

## 2021-09-27 PROCEDURE — G8417 CALC BMI ABV UP PARAM F/U: HCPCS | Performed by: FAMILY MEDICINE

## 2021-09-27 RX ORDER — CIPROFLOXACIN 250 MG/1
250 TABLET, FILM COATED ORAL 2 TIMES DAILY
Qty: 6 TABLET | Refills: 0 | Status: SHIPPED | OUTPATIENT
Start: 2021-09-27 | End: 2021-09-30

## 2021-09-27 SDOH — ECONOMIC STABILITY: FOOD INSECURITY: WITHIN THE PAST 12 MONTHS, THE FOOD YOU BOUGHT JUST DIDN'T LAST AND YOU DIDN'T HAVE MONEY TO GET MORE.: NEVER TRUE

## 2021-09-27 SDOH — ECONOMIC STABILITY: FOOD INSECURITY: WITHIN THE PAST 12 MONTHS, YOU WORRIED THAT YOUR FOOD WOULD RUN OUT BEFORE YOU GOT MONEY TO BUY MORE.: NEVER TRUE

## 2021-09-27 ASSESSMENT — PATIENT HEALTH QUESTIONNAIRE - PHQ9
SUM OF ALL RESPONSES TO PHQ QUESTIONS 1-9: 0
2. FEELING DOWN, DEPRESSED OR HOPELESS: 0
1. LITTLE INTEREST OR PLEASURE IN DOING THINGS: 0
SUM OF ALL RESPONSES TO PHQ QUESTIONS 1-9: 0
SUM OF ALL RESPONSES TO PHQ9 QUESTIONS 1 & 2: 0
SUM OF ALL RESPONSES TO PHQ QUESTIONS 1-9: 0

## 2021-09-27 ASSESSMENT — SOCIAL DETERMINANTS OF HEALTH (SDOH): HOW HARD IS IT FOR YOU TO PAY FOR THE VERY BASICS LIKE FOOD, HOUSING, MEDICAL CARE, AND HEATING?: NOT HARD AT ALL

## 2021-09-27 NOTE — PROGRESS NOTES
Subjective:      Patient ID: Magno Benavides is a 73 y.o. male. HPI  Chief Complaint   Patient presents with    Dysuria     blood in urine present times 1 day    Hematuria     Rosenda blood in urine x 2 days. No pain, fever, flank pain or vomiting  No burning with urination  No otc meds  No h/o kidney stones    Vitals:    09/27/21 1615   BP: 118/84   Site: Right Upper Arm   Position: Sitting   Pulse: 116   Resp: 16   SpO2: 96%   Weight: 219 lb 12.8 oz (99.7 kg)   Height: 5' 10\" (1.778 m)     Body mass index is 31.54 kg/m². Wt Readings from Last 3 Encounters:   09/28/21 215 lb (97.5 kg)   09/27/21 219 lb 12.8 oz (99.7 kg)   02/17/20 222 lb (100.7 kg)     BP Readings from Last 3 Encounters:   09/28/21 (!) 175/102   09/27/21 118/84   02/17/20 100/62          Review of Systems   Constitutional: Negative for chills and fever. Gastrointestinal: Positive for blood in stool. Genitourinary: Positive for hematuria. Negative for dysuria. Objective:   Physical Exam  Constitutional:       Appearance: Normal appearance. Cardiovascular:      Rate and Rhythm: Normal rate and regular rhythm. Pulmonary:      Effort: Pulmonary effort is normal.      Breath sounds: Normal breath sounds. Abdominal:      General: Abdomen is flat. There is no distension. Tenderness: There is no abdominal tenderness. There is no right CVA tenderness, left CVA tenderness or guarding. Hernia: No hernia is present. Skin:     General: Skin is warm. Coloration: Skin is not jaundiced or pale. Neurological:      Mental Status: He is alert. Assessment:      Sage Tapiaeugenie hematuria- has nitrite in urine- will start cipro to cover forinfx  May need urology f/u for rosenda heematuria      Plan:      Orders Placed This Encounter   Procedures    Culture, Urine     Order Specific Question:   Specify (ex-cath, midstream, cysto, etc)?      Answer:   midstream    URINALYSIS WITH MICROSCOPIC     Order Specific Question: SPECIFY(EX-CATH,MIDSTREAM,CYSTO,ETC)?      Answer:   midstream     Orders Placed This Encounter   Medications    ciprofloxacin (CIPRO) 250 MG tablet     Sig: Take 1 tablet by mouth 2 times daily for 3 days     Dispense:  6 tablet     Refill:  0             CHRISTOPHER Hendrickson [address], DO

## 2021-09-28 ENCOUNTER — HOSPITAL ENCOUNTER (EMERGENCY)
Age: 66
Discharge: HOME | End: 2021-09-28
Attending: EMERGENCY MEDICINE
Payer: MEDICARE

## 2021-09-28 ENCOUNTER — APPOINTMENT (OUTPATIENT)
Dept: CT IMAGING | Age: 66
End: 2021-09-28
Payer: MEDICARE

## 2021-09-28 VITALS
DIASTOLIC BLOOD PRESSURE: 74 MMHG | HEART RATE: 58 BPM | OXYGEN SATURATION: 100 % | SYSTOLIC BLOOD PRESSURE: 132 MMHG | HEIGHT: 71 IN | WEIGHT: 215 LBS | TEMPERATURE: 98.3 F | BODY MASS INDEX: 30.1 KG/M2 | RESPIRATION RATE: 16 BRPM

## 2021-09-28 DIAGNOSIS — N20.1 URETEROLITHIASIS: Primary | ICD-10-CM

## 2021-09-28 DIAGNOSIS — K80.20 ASYMPTOMATIC CHOLELITHIASIS: ICD-10-CM

## 2021-09-28 DIAGNOSIS — N23 URETERAL COLIC: ICD-10-CM

## 2021-09-28 LAB
A/G RATIO: 1 (ref 1.1–2.2)
ALBUMIN SERPL-MCNC: 4.1 G/DL (ref 3.4–5)
ALP BLD-CCNC: 81 U/L (ref 40–129)
ALT SERPL-CCNC: 18 U/L (ref 10–40)
AMORPHOUS: ABNORMAL /HPF
ANION GAP SERPL CALCULATED.3IONS-SCNC: 10 MMOL/L (ref 3–16)
AST SERPL-CCNC: 22 U/L (ref 15–37)
ATYPICAL LYMPHOCYTE RELATIVE PERCENT: 2 % (ref 0–6)
BASOPHILS ABSOLUTE: 0 K/UL (ref 0–0.2)
BASOPHILS RELATIVE PERCENT: 0 %
BILIRUB SERPL-MCNC: 0.5 MG/DL (ref 0–1)
BILIRUBIN URINE: ABNORMAL
BILIRUBIN URINE: ABNORMAL
BLOOD, URINE: ABNORMAL
BLOOD, URINE: ABNORMAL
BUN BLDV-MCNC: 17 MG/DL (ref 7–20)
CALCIUM SERPL-MCNC: 9.6 MG/DL (ref 8.3–10.6)
CHLORIDE BLD-SCNC: 100 MMOL/L (ref 99–110)
CLARITY: ABNORMAL
CLARITY: ABNORMAL
CO2: 25 MMOL/L (ref 21–32)
COLOR: ABNORMAL
COLOR: ABNORMAL
CREAT SERPL-MCNC: 1.5 MG/DL (ref 0.8–1.3)
CRYSTALS, UA: ABNORMAL /HPF
EOSINOPHILS ABSOLUTE: 0 K/UL (ref 0–0.6)
EOSINOPHILS RELATIVE PERCENT: 0 %
GFR AFRICAN AMERICAN: 57
GFR NON-AFRICAN AMERICAN: 47
GLOBULIN: 4 G/DL
GLUCOSE BLD-MCNC: 155 MG/DL (ref 70–99)
GLUCOSE URINE: NEGATIVE MG/DL
GLUCOSE URINE: NEGATIVE MG/DL
HCT VFR BLD CALC: 50.3 % (ref 40.5–52.5)
HEMATOLOGY PATH CONSULT: NORMAL
HEMATOLOGY PATH CONSULT: YES
HEMOGLOBIN: 17.1 G/DL (ref 13.5–17.5)
HYALINE CASTS: ABNORMAL /LPF (ref 0–2)
KETONES, URINE: ABNORMAL MG/DL
KETONES, URINE: NEGATIVE MG/DL
LEUKOCYTE ESTERASE, URINE: ABNORMAL
LEUKOCYTE ESTERASE, URINE: ABNORMAL
LIPASE: 24 U/L (ref 13–60)
LYMPHOCYTES ABSOLUTE: 2.2 K/UL (ref 1–5.1)
LYMPHOCYTES RELATIVE PERCENT: 13 %
MCH RBC QN AUTO: 29.9 PG (ref 26–34)
MCHC RBC AUTO-ENTMCNC: 34 G/DL (ref 31–36)
MCV RBC AUTO: 87.7 FL (ref 80–100)
MICROSCOPIC EXAMINATION: YES
MICROSCOPIC EXAMINATION: YES
MONOCYTES ABSOLUTE: 1.6 K/UL (ref 0–1.3)
MONOCYTES RELATIVE PERCENT: 11 %
NEUTROPHILS ABSOLUTE: 10.7 K/UL (ref 1.7–7.7)
NEUTROPHILS RELATIVE PERCENT: 74 %
NITRITE, URINE: NEGATIVE
NITRITE, URINE: POSITIVE
PDW BLD-RTO: 13.5 % (ref 12.4–15.4)
PH UA: 5 (ref 5–8)
PH UA: 6.5 (ref 5–8)
PLATELET # BLD: 234 K/UL (ref 135–450)
PLATELET SLIDE REVIEW: ADEQUATE
PMV BLD AUTO: 10 FL (ref 5–10.5)
POTASSIUM SERPL-SCNC: 4.2 MMOL/L (ref 3.5–5.1)
PROTEIN UA: 100 MG/DL
PROTEIN UA: 100 MG/DL
RBC # BLD: 5.74 M/UL (ref 4.2–5.9)
RBC # BLD: NORMAL 10*6/UL
RBC UA: >100 /HPF (ref 0–4)
RBC UA: >100 /HPF (ref 0–4)
SLIDE REVIEW: ABNORMAL
SODIUM BLD-SCNC: 135 MMOL/L (ref 136–145)
SPECIFIC GRAVITY UA: >=1.03 (ref 1–1.03)
SPECIFIC GRAVITY UA: >=1.03 (ref 1–1.03)
TOTAL PROTEIN: 8.1 G/DL (ref 6.4–8.2)
URINE TYPE: ABNORMAL
URINE TYPE: ABNORMAL
UROBILINOGEN, URINE: 1 E.U./DL
UROBILINOGEN, URINE: 1 E.U./DL
WBC # BLD: 14.5 K/UL (ref 4–11)
WBC UA: ABNORMAL /HPF (ref 0–5)
WBC UA: ABNORMAL /HPF (ref 0–5)

## 2021-09-28 PROCEDURE — 6360000002 HC RX W HCPCS: Performed by: EMERGENCY MEDICINE

## 2021-09-28 PROCEDURE — 85025 COMPLETE CBC W/AUTO DIFF WBC: CPT

## 2021-09-28 PROCEDURE — 2580000003 HC RX 258: Performed by: EMERGENCY MEDICINE

## 2021-09-28 PROCEDURE — 74176 CT ABD & PELVIS W/O CONTRAST: CPT

## 2021-09-28 PROCEDURE — 96375 TX/PRO/DX INJ NEW DRUG ADDON: CPT

## 2021-09-28 PROCEDURE — 80053 COMPREHEN METABOLIC PANEL: CPT

## 2021-09-28 PROCEDURE — 96374 THER/PROPH/DIAG INJ IV PUSH: CPT

## 2021-09-28 PROCEDURE — 81001 URINALYSIS AUTO W/SCOPE: CPT

## 2021-09-28 PROCEDURE — 99283 EMERGENCY DEPT VISIT LOW MDM: CPT

## 2021-09-28 PROCEDURE — 83690 ASSAY OF LIPASE: CPT

## 2021-09-28 PROCEDURE — 96376 TX/PRO/DX INJ SAME DRUG ADON: CPT

## 2021-09-28 PROCEDURE — 36415 COLL VENOUS BLD VENIPUNCTURE: CPT

## 2021-09-28 RX ORDER — IBUPROFEN 600 MG/1
600 TABLET ORAL EVERY 6 HOURS PRN
Qty: 40 TABLET | Refills: 0 | Status: SHIPPED | OUTPATIENT
Start: 2021-09-28 | End: 2021-12-01 | Stop reason: ALTCHOICE

## 2021-09-28 RX ORDER — TAMSULOSIN HYDROCHLORIDE 0.4 MG/1
0.4 CAPSULE ORAL DAILY
Qty: 30 CAPSULE | Refills: 0 | Status: SHIPPED | OUTPATIENT
Start: 2021-09-28 | End: 2021-12-01 | Stop reason: ALTCHOICE

## 2021-09-28 RX ORDER — MORPHINE SULFATE 2 MG/ML
4 INJECTION, SOLUTION INTRAMUSCULAR; INTRAVENOUS ONCE
Status: COMPLETED | OUTPATIENT
Start: 2021-09-28 | End: 2021-09-28

## 2021-09-28 RX ORDER — HYDROCODONE BITARTRATE AND ACETAMINOPHEN 5; 325 MG/1; MG/1
1 TABLET ORAL EVERY 6 HOURS PRN
Qty: 10 TABLET | Refills: 0 | Status: SHIPPED | OUTPATIENT
Start: 2021-09-28 | End: 2021-10-01

## 2021-09-28 RX ORDER — ONDANSETRON 2 MG/ML
4 INJECTION INTRAMUSCULAR; INTRAVENOUS ONCE
Status: COMPLETED | OUTPATIENT
Start: 2021-09-28 | End: 2021-09-28

## 2021-09-28 RX ORDER — SODIUM CHLORIDE 9 MG/ML
1000 INJECTION, SOLUTION INTRAVENOUS CONTINUOUS
Status: DISCONTINUED | OUTPATIENT
Start: 2021-09-28 | End: 2021-09-28 | Stop reason: HOSPADM

## 2021-09-28 RX ORDER — KETOROLAC TROMETHAMINE 30 MG/ML
30 INJECTION, SOLUTION INTRAMUSCULAR; INTRAVENOUS ONCE
Status: DISCONTINUED | OUTPATIENT
Start: 2021-09-28 | End: 2021-09-28

## 2021-09-28 RX ORDER — ONDANSETRON 4 MG/1
4 TABLET, ORALLY DISINTEGRATING ORAL EVERY 8 HOURS PRN
Qty: 15 TABLET | Refills: 0 | Status: SHIPPED | OUTPATIENT
Start: 2021-09-28 | End: 2021-12-01 | Stop reason: ALTCHOICE

## 2021-09-28 RX ORDER — KETOROLAC TROMETHAMINE 30 MG/ML
15 INJECTION, SOLUTION INTRAMUSCULAR; INTRAVENOUS ONCE
Status: COMPLETED | OUTPATIENT
Start: 2021-09-28 | End: 2021-09-28

## 2021-09-28 RX ADMIN — SODIUM CHLORIDE 1000 ML: 9 INJECTION, SOLUTION INTRAVENOUS at 08:01

## 2021-09-28 RX ADMIN — ONDANSETRON 4 MG: 2 INJECTION INTRAMUSCULAR; INTRAVENOUS at 09:01

## 2021-09-28 RX ADMIN — MORPHINE SULFATE 4 MG: 2 INJECTION, SOLUTION INTRAMUSCULAR; INTRAVENOUS at 06:59

## 2021-09-28 RX ADMIN — KETOROLAC TROMETHAMINE 15 MG: 30 INJECTION, SOLUTION INTRAMUSCULAR; INTRAVENOUS at 09:01

## 2021-09-28 RX ADMIN — ONDANSETRON 4 MG: 2 INJECTION INTRAMUSCULAR; INTRAVENOUS at 06:59

## 2021-09-28 ASSESSMENT — PAIN SCALES - GENERAL
PAINLEVEL_OUTOF10: 10
PAINLEVEL_OUTOF10: 0
PAINLEVEL_OUTOF10: 9
PAINLEVEL_OUTOF10: 10
PAINLEVEL_OUTOF10: 9

## 2021-09-28 ASSESSMENT — ENCOUNTER SYMPTOMS: BLOOD IN STOOL: 1

## 2021-09-28 NOTE — PATIENT INSTRUCTIONS

## 2021-09-28 NOTE — ED PROVIDER NOTES
CHIEF COMPLAINT  Abdominal Pain (pt had blood in urine for last couple days. Debra Bautista denies hx of stone. denies diar. Debra Bautista ), Emesis, Flank Pain, and Hematuria      HISTORY OF PRESENT ILLNESS  Mera Hutchinson is a 73 y.o. male with no significant past medical history who presents to the ED complaining of abdominal pain. Patient reports that he had noted blood in his urine for the last couple of days. Patient then began with moderate to severe flank/abdominal pain early this morning. He reports mild nausea without vomiting. No fevers, chills, or sweats. No obvious alleviating or exacerbating factors. .   No other complaints, modifying factors or associated symptoms. I have reviewed the following from the nursing documentation. History reviewed. No pertinent past medical history. History reviewed. No pertinent surgical history.   Family History   Problem Relation Age of Onset    Diabetes Brother     Dementia Mother     Diabetes Maternal Grandfather     Diabetes Brother      Social History     Socioeconomic History    Marital status: Single     Spouse name: Not on file    Number of children: Not on file    Years of education: Not on file    Highest education level: Not on file   Occupational History    Not on file   Tobacco Use    Smoking status: Former Smoker     Packs/day: 1.00     Years: 25.00     Pack years: 25.00     Types: Cigarettes     Quit date: 9/18/2006     Years since quitting: 15.0    Smokeless tobacco: Never Used   Vaping Use    Vaping Use: Never used   Substance and Sexual Activity    Alcohol use: No    Drug use: No    Sexual activity: Yes     Partners: Female   Other Topics Concern    Not on file   Social History Narrative    Not on file     Social Determinants of Health     Financial Resource Strain: Low Risk     Difficulty of Paying Living Expenses: Not hard at all   Food Insecurity: No Food Insecurity    Worried About [address] in the Last Year: Never true    Subhash of No pronator drift. Normal coordination. Gait normal.   PSYCHIATRIC: Normal mood and affect. LABS  I have reviewed all labs for this visit. Results for orders placed or performed during the hospital encounter of 09/28/21   Lipase   Result Value Ref Range    Lipase 24.0 13.0 - 60.0 U/L   Comprehensive Metabolic Panel   Result Value Ref Range    Sodium 135 (L) 136 - 145 mmol/L    Potassium 4.2 3.5 - 5.1 mmol/L    Chloride 100 99 - 110 mmol/L    CO2 25 21 - 32 mmol/L    Anion Gap 10 3 - 16    Glucose 155 (H) 70 - 99 mg/dL    BUN 17 7 - 20 mg/dL    CREATININE 1.5 (H) 0.8 - 1.3 mg/dL    GFR Non- 47 (A) >60    GFR  57 (A) >60    Calcium 9.6 8.3 - 10.6 mg/dL    Total Protein 8.1 6.4 - 8.2 g/dL    Albumin 4.1 3.4 - 5.0 g/dL    Albumin/Globulin Ratio 1.0 (L) 1.1 - 2.2    Total Bilirubin 0.5 0.0 - 1.0 mg/dL    Alkaline Phosphatase 81 40 - 129 U/L    ALT 18 10 - 40 U/L    AST 22 15 - 37 U/L    Globulin 4.0 g/dL           RADIOLOGY  X-RAYS:  I have reviewed radiologic plain film image(s). ALL OTHER NON-PLAIN FILM IMAGES SUCH AS CT, ULTRASOUND AND MRI HAVE BEEN READ BY THE RADIOLOGIST. CT ABDOMEN PELVIS WO CONTRAST Additional Contrast? None   Final Result   3 mm partially obstructing stone proximal left ureter with mild left-sided   hydronephrosis and perirenal infiltration. Punctate right nephrolithiasis without right-sided hydronephrosis. .      Cholelithiasis without complicating feature. Rechecks: Physical assessment performed. Near complete relief post Toradol. ED COURSE/MDM  Patient seen and evaluated. Old records reviewed. Labs and imaging reviewed and results discussed with patient. Patient was given Toradol, morphine and Zofran in the ED with good symptomatic relief. Patient was reassessed as noted above . No acute pathology was noted and pt is safe for discharge home to follow up with urology. Patient's labs appear stable.   CT is concerning for ureterolithiasis as well as cholelithiasis(asymptomatic at this time). Patient's urine is concerning for hematuria. Nitrates are noted. Patient is already prescribed ciprofloxacin by PCP which he states he will start today. Outpatient urine culture is also pending at this time. . Plan of care discussed with patient and family. Patient and family in agreement with plan. Patient was given scripts for the following medications. I counseled patient how to take these medications. Discharge Medication List as of 9/28/2021  9:48 AM      START taking these medications    Details   ibuprofen (IBU) 600 MG tablet Take 1 tablet by mouth every 6 hours as needed for Pain, Disp-40 tablet, R-0Normal      HYDROcodone-acetaminophen (NORCO) 5-325 MG per tablet Take 1 tablet by mouth every 6 hours as needed for Pain for up to 3 days. Intended supply: 3 days. Take lowest dose possible to manage pain, Disp-10 tablet, R-0Normal      ondansetron (ZOFRAN ODT) 4 MG disintegrating tablet Take 1 tablet by mouth every 8 hours as needed for Nausea or Vomiting, Disp-15 tablet, R-0Normal      tamsulosin (FLOMAX) 0.4 MG capsule Take 1 capsule by mouth daily, Disp-30 capsule, R-0Normal             CLINICAL IMPRESSION  1. Ureterolithiasis    2. Ureteral colic    3. Asymptomatic cholelithiasis        Blood pressure 132/74, pulse 58, temperature 98.3 °F (36.8 °C), resp. rate 16, height 5' 11\" (1.803 m), weight 215 lb (97.5 kg), SpO2 100 %. DISPOSITION  Dara Hernández was discharged to home in stable condition.         Cristhian Reis DO  09/28/21 [id number]

## 2021-09-29 LAB
ORGANISM: ABNORMAL
URINE CULTURE, ROUTINE: ABNORMAL

## 2021-09-29 RX ORDER — AMOXICILLIN 500 MG/1
500 CAPSULE ORAL 2 TIMES DAILY
Qty: 14 CAPSULE | Refills: 0 | Status: SHIPPED | OUTPATIENT
Start: 2021-09-29 | End: 2021-10-06

## 2021-12-01 ENCOUNTER — OFFICE VISIT (OUTPATIENT)
Dept: FAMILY MEDICINE CLINIC | Age: 66
End: 2021-12-01
Payer: MEDICARE

## 2021-12-01 VITALS
HEART RATE: 88 BPM | OXYGEN SATURATION: 97 % | WEIGHT: 221.4 LBS | HEIGHT: 71 IN | SYSTOLIC BLOOD PRESSURE: 136 MMHG | BODY MASS INDEX: 31 KG/M2 | DIASTOLIC BLOOD PRESSURE: 88 MMHG

## 2021-12-01 DIAGNOSIS — Z00.00 ROUTINE GENERAL MEDICAL EXAMINATION AT A HEALTH CARE FACILITY: Primary | ICD-10-CM

## 2021-12-01 DIAGNOSIS — R73.9 HYPERGLYCEMIA: ICD-10-CM

## 2021-12-01 DIAGNOSIS — Z13.220 SCREENING, LIPID: ICD-10-CM

## 2021-12-01 DIAGNOSIS — Z23 NEED FOR INFLUENZA VACCINATION: ICD-10-CM

## 2021-12-01 DIAGNOSIS — Z23 NEED FOR PROPHYLACTIC VACCINATION AGAINST STREPTOCOCCUS PNEUMONIAE (PNEUMOCOCCUS): ICD-10-CM

## 2021-12-01 DIAGNOSIS — Z12.5 SCREENING PSA (PROSTATE SPECIFIC ANTIGEN): ICD-10-CM

## 2021-12-01 PROCEDURE — 90694 VACC AIIV4 NO PRSRV 0.5ML IM: CPT | Performed by: FAMILY MEDICINE

## 2021-12-01 PROCEDURE — 90732 PPSV23 VACC 2 YRS+ SUBQ/IM: CPT | Performed by: FAMILY MEDICINE

## 2021-12-01 PROCEDURE — G0008 ADMIN INFLUENZA VIRUS VAC: HCPCS | Performed by: FAMILY MEDICINE

## 2021-12-01 PROCEDURE — 1123F ACP DISCUSS/DSCN MKR DOCD: CPT | Performed by: FAMILY MEDICINE

## 2021-12-01 PROCEDURE — G0009 ADMIN PNEUMOCOCCAL VACCINE: HCPCS | Performed by: FAMILY MEDICINE

## 2021-12-01 PROCEDURE — 4040F PNEUMOC VAC/ADMIN/RCVD: CPT | Performed by: FAMILY MEDICINE

## 2021-12-01 PROCEDURE — 3017F COLORECTAL CA SCREEN DOC REV: CPT | Performed by: FAMILY MEDICINE

## 2021-12-01 PROCEDURE — 36415 COLL VENOUS BLD VENIPUNCTURE: CPT | Performed by: FAMILY MEDICINE

## 2021-12-01 PROCEDURE — G0402 INITIAL PREVENTIVE EXAM: HCPCS | Performed by: FAMILY MEDICINE

## 2021-12-01 ASSESSMENT — LIFESTYLE VARIABLES
HAVE YOU OR SOMEONE ELSE BEEN INJURED AS A RESULT OF YOUR DRINKING: 0
HAS A RELATIVE, FRIEND, DOCTOR, OR ANOTHER HEALTH PROFESSIONAL EXPRESSED CONCERN ABOUT YOUR DRINKING OR SUGGESTED YOU CUT DOWN: 0
HOW OFTEN DO YOU HAVE SIX OR MORE DRINKS ON ONE OCCASION: 0
HOW OFTEN DURING THE LAST YEAR HAVE YOU FOUND THAT YOU WERE NOT ABLE TO STOP DRINKING ONCE YOU HAD STARTED: 0
HOW OFTEN DO YOU HAVE A DRINK CONTAINING ALCOHOL: 1
HOW MANY STANDARD DRINKS CONTAINING ALCOHOL DO YOU HAVE ON A TYPICAL DAY: 0
HOW OFTEN DURING THE LAST YEAR HAVE YOU FAILED TO DO WHAT WAS NORMALLY EXPECTED FROM YOU BECAUSE OF DRINKING: 0
AUDIT-C TOTAL SCORE: 1
HOW OFTEN DURING THE LAST YEAR HAVE YOU HAD A FEELING OF GUILT OR REMORSE AFTER DRINKING: 0
AUDIT TOTAL SCORE: 1
HOW OFTEN DURING THE LAST YEAR HAVE YOU BEEN UNABLE TO REMEMBER WHAT HAPPENED THE NIGHT BEFORE BECAUSE YOU HAD BEEN DRINKING: 0
HOW OFTEN DURING THE LAST YEAR HAVE YOU NEEDED AN ALCOHOLIC DRINK FIRST THING IN THE MORNING TO GET YOURSELF GOING AFTER A NIGHT OF HEAVY DRINKING: 0

## 2021-12-01 ASSESSMENT — PATIENT HEALTH QUESTIONNAIRE - PHQ9
2. FEELING DOWN, DEPRESSED OR HOPELESS: 0
SUM OF ALL RESPONSES TO PHQ QUESTIONS 1-9: 0
SUM OF ALL RESPONSES TO PHQ QUESTIONS 1-9: 0
1. LITTLE INTEREST OR PLEASURE IN DOING THINGS: 0
SUM OF ALL RESPONSES TO PHQ QUESTIONS 1-9: 0
SUM OF ALL RESPONSES TO PHQ9 QUESTIONS 1 & 2: 0

## 2021-12-01 NOTE — PROGRESS NOTES
Medicare Annual Wellness Visit  Name: Polo Arroyo Date: 2021   MRN: <[id number]> Sex: Male   Age: 73 y.o. Ethnicity: Non- / Non    : 1955 Race: White (non-)      Florentin Zafar is here for Medicare AWV (Patient is here for a Welcome to [facility] Visit.)    Screenings for behavioral, psychosocial and functional/safety risks, and cognitive dysfunction are all negative except as indicated below. These results, as well as other patient data from the [address] form, are documented in Flowsheets linked to this Encounter. No Known Allergies    Prior to Visit Medications    Not on File       No past medical history on file. No past surgical history on file. Family History   Problem Relation Age of Onset    Diabetes Brother     Dementia Mother     Diabetes Maternal Grandfather     Diabetes Brother        CareTeam (Including outside providers/suppliers regularly involved in providing care):   Patient Care Team:  Ziyad Mcnulty DO as PCP - General (Family Medicine)  Ziyad Mcnulty DO as PCP - [facility]    Wt Readings from Last 3 Encounters:   21 221 lb 6.4 oz (100.4 kg)   21 215 lb (97.5 kg)   21 219 lb 12.8 oz (99.7 kg)     Vitals:    21 1604   BP: 136/88   Pulse: 88   SpO2: 97%   Weight: 221 lb 6.4 oz (100.4 kg)   Height: 5' 11\" (1.803 m)     Body mass index is 30.88 kg/m². Based upon direct observation of the patient, evaluation of cognition reveals recent and remote memory intact.     General Appearance: alert and oriented to person, place and time, well developed and well- nourished, in no acute distress  Skin: warm and dry, no rash or erythema  Head: normocephalic and atraumatic  Eyes: pupils equal, round, and reactive to light, extraocular eye movements intact, conjunctivae normal  ENT: tympanic membrane, external ear and ear canal normal bilaterally, nose without deformity, nasal mucosa and turbinates normal without polyps  Neck: supple and non-tender without mass, no thyromegaly or thyroid nodules, no cervical lymphadenopathy  Pulmonary/Chest: clear to auscultation bilaterally- no wheezes, rales or rhonchi, normal air movement, no respiratory distress  Cardiovascular: normal rate, regular rhythm, normal S1 and S2, no murmurs, rubs, clicks, or gallops, distal pulses intact, no carotid bruits  Abdomen: soft, non-tender, non-distended, normal bowel sounds, no masses or organomegaly  Extremities: no cyanosis, clubbing or edema  Musculoskeletal: normal range of motion, no joint swelling, deformity or tenderness  Neurologic: reflexes normal and symmetric, no cranial nerve deficit, gait, coordination and speech normal    Patient's complete Health Risk Assessment and screening values have been reviewed and are found in Flowsheets. The following problems were reviewed today and where indicated follow up appointments were made and/or referrals ordered. Positive Risk Factor Screenings with Interventions:          General Health and ACP:  General  In general, how would you say your health is?: Very Good  In the past 7 days, have you experienced any of the following?  New or Increased Pain, New or Increased Fatigue, Loneliness, Social Isolation, Stress or Anger?: None of These  Do you get the social and emotional support that you need?: Yes  Do you have a Living Will?: (!) No  Advance Directives     Power of [address] [place] [address] Will ACP-Advance Directive ACP-Power of     Not on File Not on File Not on File Not on File      General Health Risk Interventions:  · ACP packet given    Health Habits/Nutrition:  Health Habits/Nutrition  Do you exercise for at least 20 minutes 2-3 times per week?: Yes  Have you lost any weight without trying in the past 3 months?: No  Do you eat only one meal per day?: No  Have you seen the dentist within the past year?: Appointment is scheduled  Body mass index: (!) 30.88  Health Habits/Nutrition Interventions:  · working on weight loss    Hearing/Vision:  No exam data present  Hearing/Vision  Do you or your family notice any trouble with your hearing that hasn't been managed with hearing aids?: (!) Yes  Do you have difficulty driving, watching TV, or doing any of your daily activities because of your eyesight?: No  Have you had an eye exam within the past year?: Yes  Hearing/Vision Interventions:  · Hearing concerns:  audiology referral provided      Personalized Preventive Plan   Current Health Maintenance Status  Immunization History   Administered Date(s) Administered    COVID-19, Hiral [place], Primary or Immunocompromised, PF, 100mcg/0.5mL 03/18/2021, 04/15/2021    Tdap (Boostrix, Adacel) 02/12/2018        Health Maintenance   Topic Date Due    Colon cancer screen colonoscopy  Never done    Shingles Vaccine (1 of 2) Never done    A1C test (Diabetic or Prediabetic)  09/18/2019    Pneumococcal 65+ years Vaccine (1 of 1 - PPSV23) Never done    Flu vaccine (1) Never done   [employer] Visit (AWV)  Never done    COVID-19 Vaccine (3 - Booster for Moderna series) 10/15/2021    Lipid screen  02/12/2023    DTaP/Tdap/Td vaccine (2 - Td or Tdap) 02/12/2028    AAA screen  Completed    Hepatitis C screen  Completed    HIV screen  Completed    Hepatitis A vaccine  Aged Out    Hepatitis B vaccine  Aged Out    Hib vaccine  Aged Out    Meningococcal (ACWY) vaccine  Aged Out     Recommendations for [employer] Due: see orders and patient instructions/AVS.  . Recommended screening schedule for the next 5-10 years is provided to the patient in written form: see Patient Ambreen Hummel was seen today for medicare awv.     Diagnoses and all orders for this visit:    Routine general medical examination at a health care facility

## 2021-12-01 NOTE — PATIENT INSTRUCTIONS
Personalized Preventive Plan for [place] - 12/1/2021  Medicare offers a range of preventive health benefits. Some of the tests and screenings are paid in full while other may be subject to a deductible, co-insurance, and/or copay. Some of these benefits include a comprehensive review of your medical history including lifestyle, illnesses that may run in your family, and various assessments and screenings as appropriate. After reviewing your medical record and screening and assessments performed today your provider may have ordered immunizations, labs, imaging, and/or referrals for you. A list of these orders (if applicable) as well as your Preventive Care list are included within your After Visit Summary for your review. Other Preventive Recommendations:    · A preventive eye exam performed by an eye specialist is recommended every 1-2 years to screen for glaucoma; cataracts, macular degeneration, and other eye disorders. · A preventive dental visit is recommended every 6 months. · Try to get at least 150 minutes of exercise per week or 10,000 steps per day on a pedometer . · Order or download the FREE \"Exercise & Physical Activity: Your Everyday Guide\" from [employer]. Call 5-623.546.9518 or search [employer] online. · You need 9454-8455 mg of calcium and 3310-0827 IU of vitamin D per day. It is possible to meet your calcium requirement with diet alone, but a vitamin D supplement is usually necessary to meet this goal.  · When exposed to the sun, use a sunscreen that protects against both UVA and UVB radiation with an SPF of 30 or greater. Reapply every 2 to 3 hours or after sweating, drying off with a towel, or swimming. · Always wear a seat belt when traveling in a car. Always wear a helmet when riding a bicycle or motorcycle.

## 2021-12-01 NOTE — PROGRESS NOTES
Vaccine Information Sheet, \"Influenza - Inactivated\"  given to Jt Dickey, or parent/legal guardian of  Jt Dickey and verbalized understanding. Patient responses:    Have you ever had a reaction to a flu vaccine? No  Do you have any current illness? No  Have you ever had Guillian Argos Syndrome? No  Do you have a serious allergy to any of the follow: Neomycin, Polymyxin, Thimerosal, eggs or egg products? No    Flu vaccine given per order. Please see immunization tab. Risks and benefits explained. Current VIS given.

## 2021-12-02 LAB
CHOLESTEROL, TOTAL: 212 MG/DL (ref 0–199)
ESTIMATED AVERAGE GLUCOSE: 125.5 MG/DL
HBA1C MFR BLD: 6 %
HDLC SERPL-MCNC: 34 MG/DL (ref 40–60)
LDL CHOLESTEROL CALCULATED: 135 MG/DL
PROSTATE SPECIFIC ANTIGEN: 0.26 NG/ML (ref 0–4)
TRIGL SERPL-MCNC: 213 MG/DL (ref 0–150)
VLDLC SERPL CALC-MCNC: 43 MG/DL

## 2021-12-06 NOTE — RESULT ENCOUNTER NOTE
Patient was advised of results and voiced understanding. He is willing to start on  a cholesterol medication. Please send to [facility] in [facility].

## 2021-12-07 NOTE — TELEPHONE ENCOUNTER
----- Message from Sirisha Mantilla DO sent at 12/6/2021  3:55 PM EST -----  Lipitor 20 mg 1 p.o. nightly #90  Schedule a 3-month follow-up appointment with me in the morning for fasting blood work and follow-up on new med

## 2021-12-08 RX ORDER — ATORVASTATIN CALCIUM 20 MG/1
20 TABLET, FILM COATED ORAL DAILY
Qty: 90 TABLET | Refills: 0 | Status: SHIPPED | OUTPATIENT
Start: 2021-12-08 | End: 2022-03-25

## 2021-12-09 ENCOUNTER — PROCEDURE VISIT (OUTPATIENT)
Dept: FAMILY MEDICINE CLINIC | Age: 66
End: 2021-12-09
Payer: MEDICARE

## 2021-12-09 VITALS
SYSTOLIC BLOOD PRESSURE: 123 MMHG | HEART RATE: 73 BPM | RESPIRATION RATE: 16 BRPM | HEIGHT: 71 IN | OXYGEN SATURATION: 96 % | DIASTOLIC BLOOD PRESSURE: 85 MMHG | BODY MASS INDEX: 31 KG/M2 | TEMPERATURE: 96.7 F | WEIGHT: 221.4 LBS

## 2021-12-09 DIAGNOSIS — N60.81 SEBACEOUS CYST OF BREAST, RIGHT: Primary | ICD-10-CM

## 2021-12-09 DIAGNOSIS — R23.8 IRRITATION SYMPTOM OF SKIN: ICD-10-CM

## 2021-12-09 PROCEDURE — 10061 I&D ABSCESS COMP/MULTIPLE: CPT | Performed by: PHYSICIAN ASSISTANT

## 2021-12-09 NOTE — PROGRESS NOTES
[address] COMPLAINT  Chief Complaint   Patient presents with    Cyst     Pt is here for cyst removal on chest            HISTORY OF PRESENT  ILLNESS  Florentin Zafar 73 y.o. is concerned about irritating chest wall nodule that continues to enlarge. Began 3 yrs ago. No exudates. Denies fever. Never before. No others. Past Medical, Surgical, Family, Social Histories : reviewed and updated    Medications: reviewed and updated    Allergies- reviewed and updated        PHYSICAL EXAM:    Vitals:    12/09/21 0801   BP: 123/85   Pulse: 73   Resp: 16   Temp: 96.7 °F (35.9 °C)   SpO2: 96%     General Appearance: Pleasant, friendly, well-nourished. No acute distress. PROCEDURE I&D: A circular 0.6cm x 0.6cm round well circumscribed, raised hard cyst located on left upper chest wall adjacent to sternum. NO Exudates    The cyst was anesthetized using 1 cc 1% lidocaine/epi then cleansed using betadine x3. Using a sterile 11-blade,a 0.5 cm linear cut was made to reveal an intact sebaceous cyst. The edges were palpated to extrude the cyst in toto. Hemostasis present. Steri strips applied. The patient tolerated the procedure well and post procedure denied complaints. Verbal as well as written instructions were given. ASSESSMENT/PLAN:    1. Sebaceous cyst of breast, right      Keep clean and dry for 48 hrs. Steri strips will fall off in 7 days. Monitor for signs of infection such as redness, streaking, pain, fever, or wound purulence and return to clinic immediately. Wound care instructions given.

## 2022-03-25 RX ORDER — ATORVASTATIN CALCIUM 20 MG/1
TABLET, FILM COATED ORAL
Qty: 90 TABLET | Refills: 0 | Status: SHIPPED | OUTPATIENT
Start: 2022-03-25 | End: 2022-07-19 | Stop reason: SDUPTHER

## 2022-03-25 NOTE — TELEPHONE ENCOUNTER
Massimoyanelis Barcenas [phone] (home)    is requesting refill(s) of medication Atorvastatin to preferred pharmacy [facility] [address] 12/1/21 (pertaining to medication)   Last refill 12/8/21 (per medication requested)  Next office visit scheduled or attempted No  Date   If No, reason

## 2022-07-19 ENCOUNTER — TELEPHONE (OUTPATIENT)
Dept: FAMILY MEDICINE CLINIC | Age: 67
End: 2022-07-19

## 2022-07-19 RX ORDER — ATORVASTATIN CALCIUM 20 MG/1
TABLET, FILM COATED ORAL
Qty: 90 TABLET | Refills: 1 | Status: SHIPPED | OUTPATIENT
Start: 2022-07-19

## 2022-07-19 NOTE — TELEPHONE ENCOUNTER
Refill Request         Last Seen: Last Seen Department: 12/9/2021  Last Seen by PCP: 12/1/2021    Last Written: 03/25/2022    Next Appointment:   No future appointments.  Pt aware of physician leaving           Requested Prescriptions     Pending Prescriptions Disp Refills    atorvastatin (LIPITOR) 20 MG tablet 90 tablet 1     Sig: TAKE ONE TABLET BY MOUTH DAILY

## 2022-12-07 ENCOUNTER — APPOINTMENT (OUTPATIENT)
Dept: GENERAL RADIOLOGY | Age: 67
DRG: 246 | End: 2022-12-07
Payer: MEDICARE

## 2022-12-07 ENCOUNTER — HOSPITAL ENCOUNTER (INPATIENT)
Age: 67
LOS: 2 days | Discharge: HOME | DRG: 246 | End: 2022-12-09
Attending: EMERGENCY MEDICINE | Admitting: INTERNAL MEDICINE
Payer: MEDICARE

## 2022-12-07 DIAGNOSIS — I25.5 ISCHEMIC CARDIOMYOPATHY: ICD-10-CM

## 2022-12-07 DIAGNOSIS — I50.21 ACUTE SYSTOLIC HEART FAILURE (HCC): ICD-10-CM

## 2022-12-07 DIAGNOSIS — I21.3 ST ELEVATION MYOCARDIAL INFARCTION (STEMI), UNSPECIFIED ARTERY (HCC): Primary | ICD-10-CM

## 2022-12-07 PROBLEM — D72.89 ATYPICAL LYMPHOCYTES PRESENT ON PERIPHERAL BLOOD SMEAR: Status: ACTIVE | Noted: 2022-12-07

## 2022-12-07 LAB
A/G RATIO: 0.9 (ref 1.1–2.2)
ALBUMIN SERPL-MCNC: 4 G/DL (ref 3.4–5)
ALP BLD-CCNC: 112 U/L (ref 40–129)
ALT SERPL-CCNC: 22 U/L (ref 10–40)
ANION GAP SERPL CALCULATED.3IONS-SCNC: 9 MMOL/L (ref 3–16)
APTT: 25.7 SEC (ref 23–34.3)
AST SERPL-CCNC: 24 U/L (ref 15–37)
ATYPICAL LYMPHOCYTE RELATIVE PERCENT: 28 % (ref 0–6)
BANDED NEUTROPHILS RELATIVE PERCENT: 2 % (ref 0–7)
BASOPHILS ABSOLUTE: 0 K/UL (ref 0–0.2)
BASOPHILS RELATIVE PERCENT: 0 %
BILIRUB SERPL-MCNC: 0.4 MG/DL (ref 0–1)
BUN BLDV-MCNC: 13 MG/DL (ref 7–20)
CALCIUM SERPL-MCNC: 9.3 MG/DL (ref 8.3–10.6)
CHLORIDE BLD-SCNC: 102 MMOL/L (ref 99–110)
CO2: 28 MMOL/L (ref 21–32)
CREAT SERPL-MCNC: 1.1 MG/DL (ref 0.8–1.3)
EOSINOPHILS ABSOLUTE: 0 K/UL (ref 0–0.6)
EOSINOPHILS RELATIVE PERCENT: 0 %
GFR SERPL CREATININE-BSD FRML MDRD: >60 ML/MIN/{1.73_M2}
GLUCOSE BLD-MCNC: 138 MG/DL (ref 70–99)
HCT VFR BLD CALC: 48.5 % (ref 40.5–52.5)
HEMATOLOGY PATH CONSULT: YES
HEMOGLOBIN: 16.2 G/DL (ref 13.5–17.5)
LYMPHOCYTES ABSOLUTE: 7.1 K/UL (ref 1–5.1)
LYMPHOCYTES RELATIVE PERCENT: 17 %
MCH RBC QN AUTO: 29.3 PG (ref 26–34)
MCHC RBC AUTO-ENTMCNC: 33.3 G/DL (ref 31–36)
MCV RBC AUTO: 88.1 FL (ref 80–100)
METAMYELOCYTES RELATIVE PERCENT: 2 %
MONOCYTES ABSOLUTE: 0.9 K/UL (ref 0–1.3)
MONOCYTES RELATIVE PERCENT: 6 %
NEUTROPHILS ABSOLUTE: 7.7 K/UL (ref 1.7–7.7)
NEUTROPHILS RELATIVE PERCENT: 45 %
PDW BLD-RTO: 13.4 % (ref 12.4–15.4)
PLATELET # BLD: 248 K/UL (ref 135–450)
PLATELET SLIDE REVIEW: ADEQUATE
PMV BLD AUTO: 10.7 FL (ref 5–10.5)
POTASSIUM REFLEX MAGNESIUM: 3.8 MMOL/L (ref 3.5–5.1)
RBC # BLD: 5.51 M/UL (ref 4.2–5.9)
SLIDE REVIEW: ABNORMAL
SODIUM BLD-SCNC: 139 MMOL/L (ref 136–145)
TEAR DROP CELLS: ABNORMAL
TOTAL PROTEIN: 8.3 G/DL (ref 6.4–8.2)
TROPONIN: <0.01 NG/ML
WBC # BLD: 15.7 K/UL (ref 4–11)

## 2022-12-07 PROCEDURE — C1874 STENT, COATED/COV W/DEL SYS: HCPCS

## 2022-12-07 PROCEDURE — 99285 EMERGENCY DEPT VISIT HI MDM: CPT

## 2022-12-07 PROCEDURE — 6360000002 HC RX W HCPCS

## 2022-12-07 PROCEDURE — 027034Z DILATION OF CORONARY ARTERY, ONE ARTERY WITH DRUG-ELUTING INTRALUMINAL DEVICE, PERCUTANEOUS APPROACH: ICD-10-PCS | Performed by: INTERNAL MEDICINE

## 2022-12-07 PROCEDURE — 2500000003 HC RX 250 WO HCPCS

## 2022-12-07 PROCEDURE — 2580000003 HC RX 258: Performed by: INTERNAL MEDICINE

## 2022-12-07 PROCEDURE — 6360000002 HC RX W HCPCS: Performed by: EMERGENCY MEDICINE

## 2022-12-07 PROCEDURE — 93458 L HRT ARTERY/VENTRICLE ANGIO: CPT

## 2022-12-07 PROCEDURE — 93005 ELECTROCARDIOGRAM TRACING: CPT | Performed by: INTERNAL MEDICINE

## 2022-12-07 PROCEDURE — 85347 COAGULATION TIME ACTIVATED: CPT

## 2022-12-07 PROCEDURE — 84484 ASSAY OF TROPONIN QUANT: CPT

## 2022-12-07 PROCEDURE — C1725 CATH, TRANSLUMIN NON-LASER: HCPCS

## 2022-12-07 PROCEDURE — B2151ZZ FLUOROSCOPY OF LEFT HEART USING LOW OSMOLAR CONTRAST: ICD-10-PCS | Performed by: INTERNAL MEDICINE

## 2022-12-07 PROCEDURE — 6370000000 HC RX 637 (ALT 250 FOR IP): Performed by: EMERGENCY MEDICINE

## 2022-12-07 PROCEDURE — 93005 ELECTROCARDIOGRAM TRACING: CPT | Performed by: EMERGENCY MEDICINE

## 2022-12-07 PROCEDURE — 71045 X-RAY EXAM CHEST 1 VIEW: CPT

## 2022-12-07 PROCEDURE — 93005 ELECTROCARDIOGRAM TRACING: CPT

## 2022-12-07 PROCEDURE — 85025 COMPLETE CBC W/AUTO DIFF WBC: CPT

## 2022-12-07 PROCEDURE — C1769 GUIDE WIRE: HCPCS

## 2022-12-07 PROCEDURE — 80053 COMPREHEN METABOLIC PANEL: CPT

## 2022-12-07 PROCEDURE — 6360000002 HC RX W HCPCS: Performed by: INTERNAL MEDICINE

## 2022-12-07 PROCEDURE — 2000000000 HC ICU R&B

## 2022-12-07 PROCEDURE — B2111ZZ FLUOROSCOPY OF MULTIPLE CORONARY ARTERIES USING LOW OSMOLAR CONTRAST: ICD-10-PCS | Performed by: INTERNAL MEDICINE

## 2022-12-07 PROCEDURE — 85730 THROMBOPLASTIN TIME PARTIAL: CPT

## 2022-12-07 PROCEDURE — 96374 THER/PROPH/DIAG INJ IV PUSH: CPT

## 2022-12-07 PROCEDURE — 99223 1ST HOSP IP/OBS HIGH 75: CPT | Performed by: INTERNAL MEDICINE

## 2022-12-07 PROCEDURE — C9606 PERC D-E COR REVASC W AMI S: HCPCS

## 2022-12-07 PROCEDURE — 4A023N7 MEASUREMENT OF CARDIAC SAMPLING AND PRESSURE, LEFT HEART, PERCUTANEOUS APPROACH: ICD-10-PCS | Performed by: INTERNAL MEDICINE

## 2022-12-07 PROCEDURE — 96375 TX/PRO/DX INJ NEW DRUG ADDON: CPT

## 2022-12-07 RX ORDER — ONDANSETRON 2 MG/ML
4 INJECTION INTRAMUSCULAR; INTRAVENOUS EVERY 6 HOURS PRN
Status: DISCONTINUED | OUTPATIENT
Start: 2022-12-07 | End: 2022-12-09 | Stop reason: HOSPADM

## 2022-12-07 RX ORDER — CARVEDILOL 3.12 MG/1
3.12 TABLET ORAL 2 TIMES DAILY WITH MEALS
Status: DISCONTINUED | OUTPATIENT
Start: 2022-12-08 | End: 2022-12-09 | Stop reason: HOSPADM

## 2022-12-07 RX ORDER — FENTANYL CITRATE 50 UG/ML
INJECTION, SOLUTION INTRAMUSCULAR; INTRAVENOUS
Status: COMPLETED | OUTPATIENT
Start: 2022-12-07 | End: 2022-12-07

## 2022-12-07 RX ORDER — EPTIFIBATIDE 0.75 MG/ML
2 INJECTION, SOLUTION INTRAVENOUS CONTINUOUS
Status: DISPENSED | OUTPATIENT
Start: 2022-12-07 | End: 2022-12-08

## 2022-12-07 RX ORDER — ENOXAPARIN SODIUM 100 MG/ML
30 INJECTION SUBCUTANEOUS 2 TIMES DAILY
Status: DISCONTINUED | OUTPATIENT
Start: 2022-12-08 | End: 2022-12-09 | Stop reason: HOSPADM

## 2022-12-07 RX ORDER — MIDAZOLAM HYDROCHLORIDE 1 MG/ML
INJECTION INTRAMUSCULAR; INTRAVENOUS
Status: COMPLETED | OUTPATIENT
Start: 2022-12-07 | End: 2022-12-07

## 2022-12-07 RX ORDER — SODIUM CHLORIDE 0.9 % (FLUSH) 0.9 %
5-40 SYRINGE (ML) INJECTION PRN
Status: DISCONTINUED | OUTPATIENT
Start: 2022-12-07 | End: 2022-12-09 | Stop reason: HOSPADM

## 2022-12-07 RX ORDER — ASPIRIN 81 MG/1
324 TABLET, CHEWABLE ORAL ONCE
Status: COMPLETED | OUTPATIENT
Start: 2022-12-07 | End: 2022-12-07

## 2022-12-07 RX ORDER — HEPARIN SODIUM 1000 [USP'U]/ML
INJECTION, SOLUTION INTRAVENOUS; SUBCUTANEOUS
Status: COMPLETED | OUTPATIENT
Start: 2022-12-07 | End: 2022-12-07

## 2022-12-07 RX ORDER — MORPHINE SULFATE 4 MG/ML
4 INJECTION, SOLUTION INTRAMUSCULAR; INTRAVENOUS ONCE
Status: COMPLETED | OUTPATIENT
Start: 2022-12-07 | End: 2022-12-07

## 2022-12-07 RX ORDER — ATORVASTATIN CALCIUM 20 MG/1
1 TABLET, FILM COATED ORAL DAILY
Status: DISCONTINUED | OUTPATIENT
Start: 2022-12-08 | End: 2022-12-07

## 2022-12-07 RX ORDER — SODIUM CHLORIDE 0.9 % (FLUSH) 0.9 %
5-40 SYRINGE (ML) INJECTION EVERY 12 HOURS SCHEDULED
Status: DISCONTINUED | OUTPATIENT
Start: 2022-12-07 | End: 2022-12-09 | Stop reason: HOSPADM

## 2022-12-07 RX ORDER — MAGNESIUM SULFATE 1 G/100ML
1000 INJECTION INTRAVENOUS PRN
Status: DISCONTINUED | OUTPATIENT
Start: 2022-12-07 | End: 2022-12-09 | Stop reason: HOSPADM

## 2022-12-07 RX ORDER — ATORVASTATIN CALCIUM 80 MG/1
40 TABLET, FILM COATED ORAL NIGHTLY
Status: DISCONTINUED | OUTPATIENT
Start: 2022-12-08 | End: 2022-12-08

## 2022-12-07 RX ORDER — LANOLIN ALCOHOL/MO/W.PET/CERES
3 CREAM (GRAM) TOPICAL NIGHTLY PRN
Status: DISCONTINUED | OUTPATIENT
Start: 2022-12-08 | End: 2022-12-09 | Stop reason: HOSPADM

## 2022-12-07 RX ORDER — POTASSIUM CHLORIDE 29.8 MG/ML
20 INJECTION INTRAVENOUS PRN
Status: DISCONTINUED | OUTPATIENT
Start: 2022-12-07 | End: 2022-12-09 | Stop reason: HOSPADM

## 2022-12-07 RX ORDER — SODIUM CHLORIDE 9 MG/ML
INJECTION, SOLUTION INTRAVENOUS PRN
Status: DISCONTINUED | OUTPATIENT
Start: 2022-12-07 | End: 2022-12-09 | Stop reason: HOSPADM

## 2022-12-07 RX ORDER — CALCIUM CARBONATE 200(500)MG
1000 TABLET,CHEWABLE ORAL 3 TIMES DAILY PRN
Status: DISCONTINUED | OUTPATIENT
Start: 2022-12-07 | End: 2022-12-09 | Stop reason: HOSPADM

## 2022-12-07 RX ORDER — ACETAMINOPHEN 650 MG/1
650 SUPPOSITORY RECTAL EVERY 4 HOURS PRN
Status: DISCONTINUED | OUTPATIENT
Start: 2022-12-07 | End: 2022-12-09 | Stop reason: HOSPADM

## 2022-12-07 RX ORDER — ONDANSETRON 2 MG/ML
4 INJECTION INTRAMUSCULAR; INTRAVENOUS ONCE
Status: COMPLETED | OUTPATIENT
Start: 2022-12-07 | End: 2022-12-07

## 2022-12-07 RX ORDER — ACETAMINOPHEN 325 MG/1
650 TABLET ORAL EVERY 4 HOURS PRN
Status: DISCONTINUED | OUTPATIENT
Start: 2022-12-07 | End: 2022-12-09 | Stop reason: HOSPADM

## 2022-12-07 RX ORDER — HEPARIN SODIUM 1000 [USP'U]/ML
4000 INJECTION, SOLUTION INTRAVENOUS; SUBCUTANEOUS ONCE
Status: COMPLETED | OUTPATIENT
Start: 2022-12-07 | End: 2022-12-07

## 2022-12-07 RX ORDER — ACETAMINOPHEN 325 MG/1
650 TABLET ORAL EVERY 4 HOURS PRN
Status: DISCONTINUED | OUTPATIENT
Start: 2022-12-07 | End: 2022-12-08

## 2022-12-07 RX ORDER — NITROGLYCERIN 0.4 MG/1
0.4 TABLET SUBLINGUAL EVERY 5 MIN PRN
Status: DISCONTINUED | OUTPATIENT
Start: 2022-12-07 | End: 2022-12-09 | Stop reason: HOSPADM

## 2022-12-07 RX ORDER — ASPIRIN 81 MG/1
81 TABLET, CHEWABLE ORAL DAILY
Status: DISCONTINUED | OUTPATIENT
Start: 2022-12-08 | End: 2022-12-09 | Stop reason: HOSPADM

## 2022-12-07 RX ORDER — POTASSIUM CHLORIDE 7.45 MG/ML
10 INJECTION INTRAVENOUS PRN
Status: DISCONTINUED | OUTPATIENT
Start: 2022-12-07 | End: 2022-12-09 | Stop reason: HOSPADM

## 2022-12-07 RX ADMIN — HEPARIN SODIUM 2000 UNITS: 1000 INJECTION, SOLUTION INTRAVENOUS; SUBCUTANEOUS at 22:21

## 2022-12-07 RX ADMIN — MIDAZOLAM HYDROCHLORIDE 1 MG: 1 INJECTION INTRAMUSCULAR; INTRAVENOUS at 21:49

## 2022-12-07 RX ADMIN — Medication 10 ML: at 23:30

## 2022-12-07 RX ADMIN — MIDAZOLAM HYDROCHLORIDE 1 MG: 1 INJECTION INTRAMUSCULAR; INTRAVENOUS at 21:43

## 2022-12-07 RX ADMIN — MIDAZOLAM HYDROCHLORIDE 1 MG: 1 INJECTION INTRAMUSCULAR; INTRAVENOUS at 21:32

## 2022-12-07 RX ADMIN — FENTANYL CITRATE 25 MCG: 50 INJECTION, SOLUTION INTRAMUSCULAR; INTRAVENOUS at 21:32

## 2022-12-07 RX ADMIN — EPTIFIBATIDE 2 MCG/KG/MIN: 0.75 INJECTION INTRAVENOUS at 22:25

## 2022-12-07 RX ADMIN — HEPARIN SODIUM 5000 UNITS: 1000 INJECTION, SOLUTION INTRAVENOUS; SUBCUTANEOUS at 21:32

## 2022-12-07 RX ADMIN — FENTANYL CITRATE 50 MCG: 50 INJECTION, SOLUTION INTRAMUSCULAR; INTRAVENOUS at 21:49

## 2022-12-07 RX ADMIN — EPTIFIBATIDE 2 MCG/KG/MIN: 0.75 INJECTION INTRAVENOUS at 23:43

## 2022-12-07 RX ADMIN — FENTANYL CITRATE 50 MCG: 50 INJECTION, SOLUTION INTRAMUSCULAR; INTRAVENOUS at 21:29

## 2022-12-07 RX ADMIN — NITROGLYCERIN 0.4 MG: 0.4 TABLET, ORALLY DISINTEGRATING SUBLINGUAL at 20:46

## 2022-12-07 RX ADMIN — MIDAZOLAM HYDROCHLORIDE 2 MG: 1 INJECTION INTRAMUSCULAR; INTRAVENOUS at 21:29

## 2022-12-07 RX ADMIN — HEPARIN SODIUM 4000 UNITS: 1000 INJECTION INTRAVENOUS; SUBCUTANEOUS at 20:49

## 2022-12-07 RX ADMIN — FENTANYL CITRATE 50 MCG: 50 INJECTION, SOLUTION INTRAMUSCULAR; INTRAVENOUS at 21:53

## 2022-12-07 RX ADMIN — ONDANSETRON 4 MG: 2 INJECTION INTRAMUSCULAR; INTRAVENOUS at 20:45

## 2022-12-07 RX ADMIN — TICAGRELOR 180 MG: 90 TABLET ORAL at 20:49

## 2022-12-07 RX ADMIN — MORPHINE SULFATE 4 MG: 4 INJECTION, SOLUTION INTRAMUSCULAR; INTRAVENOUS at 20:44

## 2022-12-07 RX ADMIN — METOPROLOL TARTRATE 25 MG: 50 TABLET ORAL at 20:51

## 2022-12-07 RX ADMIN — MIDAZOLAM HYDROCHLORIDE 1 MG: 1 INJECTION INTRAMUSCULAR; INTRAVENOUS at 21:52

## 2022-12-07 RX ADMIN — FENTANYL CITRATE 25 MCG: 50 INJECTION, SOLUTION INTRAMUSCULAR; INTRAVENOUS at 21:43

## 2022-12-07 RX ADMIN — ASPIRIN 324 MG: 81 TABLET, CHEWABLE ORAL at 20:43

## 2022-12-07 ASSESSMENT — ENCOUNTER SYMPTOMS
SORE THROAT: 0
SHORTNESS OF BREATH: 0
ABDOMINAL PAIN: 0
EYE PAIN: 0
BACK PAIN: 0

## 2022-12-07 ASSESSMENT — PAIN DESCRIPTION - FREQUENCY: FREQUENCY: CONTINUOUS

## 2022-12-07 ASSESSMENT — PAIN DESCRIPTION - DESCRIPTORS: DESCRIPTORS: ACHING;PRESSURE

## 2022-12-07 ASSESSMENT — PAIN SCALES - GENERAL
PAINLEVEL_OUTOF10: 4
PAINLEVEL_OUTOF10: 6
PAINLEVEL_OUTOF10: 4

## 2022-12-07 ASSESSMENT — PAIN DESCRIPTION - PAIN TYPE: TYPE: ACUTE PAIN

## 2022-12-07 ASSESSMENT — PAIN DESCRIPTION - LOCATION
LOCATION: CHEST

## 2022-12-07 ASSESSMENT — PAIN DESCRIPTION - ONSET: ONSET: ON-GOING

## 2022-12-07 ASSESSMENT — PAIN - FUNCTIONAL ASSESSMENT
PAIN_FUNCTIONAL_ASSESSMENT: ACTIVITIES ARE NOT PREVENTED
PAIN_FUNCTIONAL_ASSESSMENT: 0-10

## 2022-12-07 ASSESSMENT — PAIN DESCRIPTION - ORIENTATION: ORIENTATION: MID

## 2022-12-08 PROBLEM — Z87.891 FORMER TOBACCO USE: Status: ACTIVE | Noted: 2022-12-08

## 2022-12-08 PROBLEM — Z98.61 CAD S/P PERCUTANEOUS CORONARY ANGIOPLASTY: Status: ACTIVE | Noted: 2022-12-08

## 2022-12-08 PROBLEM — I50.21 ACUTE SYSTOLIC HEART FAILURE (HCC): Status: ACTIVE | Noted: 2022-12-08

## 2022-12-08 PROBLEM — E78.5 HYPERLIPIDEMIA: Status: ACTIVE | Noted: 2022-12-08

## 2022-12-08 PROBLEM — I25.10 CAD S/P PERCUTANEOUS CORONARY ANGIOPLASTY: Status: ACTIVE | Noted: 2022-12-08

## 2022-12-08 PROBLEM — I25.5 ISCHEMIC CARDIOMYOPATHY: Status: ACTIVE | Noted: 2022-12-08

## 2022-12-08 LAB
ANION GAP SERPL CALCULATED.3IONS-SCNC: 6 MMOL/L (ref 3–16)
BUN BLDV-MCNC: 11 MG/DL (ref 7–20)
CALCIUM SERPL-MCNC: 8.7 MG/DL (ref 8.3–10.6)
CHLORIDE BLD-SCNC: 105 MMOL/L (ref 99–110)
CHOLESTEROL, TOTAL: 139 MG/DL (ref 0–199)
CO2: 27 MMOL/L (ref 21–32)
CREAT SERPL-MCNC: 0.9 MG/DL (ref 0.8–1.3)
EKG ATRIAL RATE: 67 BPM
EKG ATRIAL RATE: 93 BPM
EKG DIAGNOSIS: NORMAL
EKG DIAGNOSIS: NORMAL
EKG P AXIS: 18 DEGREES
EKG P AXIS: 7 DEGREES
EKG P-R INTERVAL: 150 MS
EKG P-R INTERVAL: 172 MS
EKG Q-T INTERVAL: 364 MS
EKG Q-T INTERVAL: 418 MS
EKG QRS DURATION: 92 MS
EKG QRS DURATION: 92 MS
EKG QTC CALCULATION (BAZETT): 441 MS
EKG QTC CALCULATION (BAZETT): 452 MS
EKG R AXIS: -13 DEGREES
EKG R AXIS: -4 DEGREES
EKG T AXIS: 13 DEGREES
EKG T AXIS: 7 DEGREES
EKG VENTRICULAR RATE: 67 BPM
EKG VENTRICULAR RATE: 93 BPM
GFR SERPL CREATININE-BSD FRML MDRD: >60 ML/MIN/{1.73_M2}
GLUCOSE BLD-MCNC: 110 MG/DL (ref 70–99)
HDLC SERPL-MCNC: 28 MG/DL (ref 40–60)
LDL CHOLESTEROL CALCULATED: 83 MG/DL
MAGNESIUM: 1.9 MG/DL (ref 1.8–2.4)
POC ACT LR: 278 SEC
POC ACT LR: 354 SEC
POC ACT LR: 367 SEC
POTASSIUM SERPL-SCNC: 4.6 MMOL/L (ref 3.5–5.1)
SODIUM BLD-SCNC: 138 MMOL/L (ref 136–145)
TRIGL SERPL-MCNC: 140 MG/DL (ref 0–150)
TROPONIN: 0.43 NG/ML
TROPONIN: 0.69 NG/ML
TSH REFLEX FT4: 1.13 UIU/ML (ref 0.27–4.2)
VLDLC SERPL CALC-MCNC: 28 MG/DL

## 2022-12-08 PROCEDURE — 83036 HEMOGLOBIN GLYCOSYLATED A1C: CPT

## 2022-12-08 PROCEDURE — 93306 TTE W/DOPPLER COMPLETE: CPT

## 2022-12-08 PROCEDURE — 6370000000 HC RX 637 (ALT 250 FOR IP): Performed by: INTERNAL MEDICINE

## 2022-12-08 PROCEDURE — 93010 ELECTROCARDIOGRAM REPORT: CPT | Performed by: INTERNAL MEDICINE

## 2022-12-08 PROCEDURE — 92941 PRQ TRLML REVSC TOT OCCL AMI: CPT | Performed by: INTERNAL MEDICINE

## 2022-12-08 PROCEDURE — 2000000000 HC ICU R&B

## 2022-12-08 PROCEDURE — 84484 ASSAY OF TROPONIN QUANT: CPT

## 2022-12-08 PROCEDURE — 80048 BASIC METABOLIC PNL TOTAL CA: CPT

## 2022-12-08 PROCEDURE — 99024 POSTOP FOLLOW-UP VISIT: CPT | Performed by: INTERNAL MEDICINE

## 2022-12-08 PROCEDURE — 84443 ASSAY THYROID STIM HORMONE: CPT

## 2022-12-08 PROCEDURE — 83735 ASSAY OF MAGNESIUM: CPT

## 2022-12-08 PROCEDURE — 6360000002 HC RX W HCPCS: Performed by: INTERNAL MEDICINE

## 2022-12-08 PROCEDURE — 93458 L HRT ARTERY/VENTRICLE ANGIO: CPT | Performed by: INTERNAL MEDICINE

## 2022-12-08 PROCEDURE — 80061 LIPID PANEL: CPT

## 2022-12-08 PROCEDURE — 2580000003 HC RX 258: Performed by: INTERNAL MEDICINE

## 2022-12-08 PROCEDURE — 88185 FLOWCYTOMETRY/TC ADD-ON: CPT

## 2022-12-08 PROCEDURE — 88184 FLOWCYTOMETRY/ TC 1 MARKER: CPT

## 2022-12-08 PROCEDURE — 36415 COLL VENOUS BLD VENIPUNCTURE: CPT

## 2022-12-08 RX ORDER — ATORVASTATIN CALCIUM 80 MG/1
80 TABLET, FILM COATED ORAL NIGHTLY
Status: DISCONTINUED | OUTPATIENT
Start: 2022-12-08 | End: 2022-12-09 | Stop reason: HOSPADM

## 2022-12-08 RX ORDER — VALSARTAN 80 MG/1
20 TABLET ORAL DAILY
Status: DISCONTINUED | OUTPATIENT
Start: 2022-12-08 | End: 2022-12-09

## 2022-12-08 RX ORDER — FUROSEMIDE 10 MG/ML
20 INJECTION INTRAMUSCULAR; INTRAVENOUS ONCE
Status: COMPLETED | OUTPATIENT
Start: 2022-12-08 | End: 2022-12-08

## 2022-12-08 RX ADMIN — ATORVASTATIN CALCIUM 80 MG: 80 TABLET, FILM COATED ORAL at 19:36

## 2022-12-08 RX ADMIN — FUROSEMIDE 20 MG: 10 INJECTION, SOLUTION INTRAMUSCULAR; INTRAVENOUS at 10:44

## 2022-12-08 RX ADMIN — MUPIROCIN: 20 OINTMENT TOPICAL at 19:37

## 2022-12-08 RX ADMIN — Medication 10 ML: at 09:02

## 2022-12-08 RX ADMIN — EPTIFIBATIDE 2 MCG/KG/MIN: 0.75 INJECTION INTRAVENOUS at 05:17

## 2022-12-08 RX ADMIN — ENOXAPARIN SODIUM 30 MG: 100 INJECTION SUBCUTANEOUS at 19:36

## 2022-12-08 RX ADMIN — TICAGRELOR 90 MG: 90 TABLET ORAL at 09:02

## 2022-12-08 RX ADMIN — ENOXAPARIN SODIUM 30 MG: 100 INJECTION SUBCUTANEOUS at 09:02

## 2022-12-08 RX ADMIN — TICAGRELOR 90 MG: 90 TABLET ORAL at 19:36

## 2022-12-08 RX ADMIN — MUPIROCIN: 20 OINTMENT TOPICAL at 09:02

## 2022-12-08 RX ADMIN — Medication 10 ML: at 19:37

## 2022-12-08 RX ADMIN — ASPIRIN 81 MG: 81 TABLET, CHEWABLE ORAL at 09:02

## 2022-12-08 RX ADMIN — CARVEDILOL 3.12 MG: 3.12 TABLET, FILM COATED ORAL at 09:02

## 2022-12-08 RX ADMIN — VALSARTAN 20 MG: 80 TABLET, FILM COATED ORAL at 10:44

## 2022-12-08 NOTE — H&P
Hospital Medicine History & Physical      Patient: Kim Haynes  :  1955  MRN:  [id number]    Date of Service: 22    Chief Complaint   Patient presents with    Chest Pain     Chest pain x15 minutes-mid chest pain. Sattes he was sitting watching TV, started getting chest pain and then vomited. No cardiac history per pt. HISTORY OF PRESENT ILLNESS:    Kim Haynes is a 78 y.o. male. He presented to the ER from home with chest pain. He was diagnosed with an anterior STEMI in the ER. He was taken for urgent coronary angiography. I evaluated the patient in the ICU after coronary angiography and intervention. By that time the patient was completely asymptomatic. Review of Systems:  All pertinent positives and negatives are as noted in the HPI section. All other systems were reviewed and are negative. PMH  Hyperlipidemia    PSH   Cystoscopy for kidney stone & ureteral stent placement      Prior to Admission medications    Medication Sig Start Date End Date Taking? Authorizing Provider   atorvastatin (LIPITOR) 20 MG tablet TAKE ONE TABLET BY MOUTH DAILY 22   Ryan Amaya DO       Allergies:   Patient has no known allergies. Social:   reports that he quit smoking about 16 years ago. His smoking use included cigarettes. He has a 25.00 pack-year smoking history. He has never used smokeless tobacco.   reports no history of alcohol use. Social History     Substance and Sexual Activity   Drug Use No       Family History   Problem Relation Age of Onset    Diabetes Brother     Dementia Mother     Diabetes Maternal Grandfather     Diabetes Brother        PHYSICAL EXAM:  I performed this physical examination.     Vitals:  Patient Vitals for the past 24 hrs:   BP Temp Temp src Pulse Resp SpO2 Height Weight   22 2300 103/75 -- -- 70 12 97 % -- --   22 2235 115/77 97.9 °F (36.6 °C) Oral 75 16 97 % 5' 11\" (1.803 m) 226 lb 13.7 oz (102.9 kg)   22 2101 (!) 141/82 -- -- 80 -- 97 % -- --   12/07/22 2055 (!) 134/92 -- -- 93 17 95 % -- 218 lb (98.9 kg)   12/07/22 2047 (!) 154/100 97.9 °F (36.6 °C) Oral (!) 104 22 98 % -- --         GEN:  Appearance:  Age appropriate WM in NAD . Level of Consciousness:  alert . Orientation:  full    HEENT: Sclera anicteric.  no conjunctival chemosis. moist mucus membranes. no specific or diagnostic oral lesions. NECK:  no signs of meningismus. Jugular veins not distended. Carotid pulses  2+.  no cervical lymphadenopathy. no thyromegaly. CV:  regular rhythm. normal S1 & S2.    no murmur. no rub.  no gallop. PULM:  Chest excursion is symmetric. Breath sounds are vesicular. Adventitious sounds:  none    AB:  Abdominal shape is normal.  Bowel sounds are active. Generally soft to palpation. no tenderness is present. no involuntary guarding. no rebound guarding. EXTR:  Skin is cool. Capillary refill brisk. no specific or pathognomic rash. no clubbing. no pitting edema. no active wound or ulcer. TR band in place on right wrist.      LABS:  Lab Results   Component Value Date    WBC 15.7 (H) 12/07/2022    HGB 16.2 12/07/2022    HCT 48.5 12/07/2022    MCV 88.1 12/07/2022     12/07/2022     Lab Results   Component Value Date    CREATININE 1.1 12/07/2022    BUN 13 12/07/2022     12/07/2022    K 3.8 12/07/2022     12/07/2022    CO2 28 12/07/2022     Lab Results   Component Value Date    ALT 22 12/07/2022    AST 24 12/07/2022    ALKPHOS 112 12/07/2022    BILITOT 0.4 12/07/2022     Lab Results   Component Value Date    TROPONINI <0.01 12/07/2022     No results for input(s): PHART, VAU0GRV, PO2ART in the last 72 hours. IMAGING:  XR CHEST PORTABLE    Result Date: 12/7/2022  EXAMINATION: ONE XRAY VIEW OF THE CHEST 12/7/2022 8:37 pm COMPARISON: None.  HISTORY: ORDERING SYSTEM PROVIDED HISTORY: chest pain TECHNOLOGIST PROVIDED HISTORY: Reason for exam:->chest pain Reason for Exam: chest pain FINDINGS: Cardiac silhouette is at the upper limits of normal.  Extensive interstitial infiltrates are seen throughout the lungs bilaterally. No pneumothorax. No acute osseous abnormality. Diffuse interstitial infiltrates seen throughout the lungs which may represent pulmonary edema or interstitial pneumonia. I directly reviewed all recent imaging studies as well as pertinent prior studies. Radiology reports may or may not be available at the time of my review. EKG:  New and pertinent prior tracings were directly reviewed. My interpretation is as follows:  Normal sinus rhythm. Pathologic ST elevations in V1-4. One PVC. Active Hospital Problems    Diagnosis Date Noted    Hyperlipidemia [E78.5] 12/08/2022     Priority: Medium    Former tobacco use [Z87.891] 12/08/2022     Priority: Medium    CAD S/P percutaneous coronary angioplasty [I25.10, Z98.61] 12/08/2022     Priority: Medium    Acute systolic heart failure ([address] [I50.21] 12/08/2022     Priority: Medium    Ischemic cardiomyopathy [I25.5] 12/08/2022     Priority: Medium    STEMI (ST elevation myocardial infarction) ([facility] [address] [I21.3] 12/07/2022     Priority: Medium    Atypical lymphocytes present on peripheral blood smear [D72.89] 12/07/2022     Priority: Medium       ASSESSMENT & PLAN  Anterior STEMI, CAD s/p PCI, Acute HF, Ischemic CM  -  Mid LAD 95% stenosis now s/p MANOLO x 1. LVEF ~ 40% by ventriculography. TTE requested to further evaluate cardiac function. May warrant treatment with an ACE or ARB. -  Will complete a 6-hour integrillin infusion. Thereafter lifelong ASA 81mg daily and DAPT for at least a year. Ticagrelor started by cardiology. -  Patient was counseled regarding the importance of compliance with DAPT for at least the next year. -  Continue atorvastatin at increased dose compared to home (40mg qhs) and start coreg 3.125mg BID per cardiology. Lymphocyte Atypia  -  WBC = 15.7 K/mcL w/ 28% atypical lymphocytes. Worsened compared to 2% in 9/2021. Also 2% circulating metamyelocytes. Other cell lines ok. I was unable to find any other previous outside labs. -  Send peripheral blood sample for leukemia/lymphoma flow cytometry panel. DVT prophylaxis: SCDs, lovenox  Code Status:  Full  Disposition:  Inpatient w/ anticipated d/c to home in 1-2 days.     Mera Gaytan MD MD

## 2022-12-08 NOTE — PROGRESS NOTES
Perfect serve message STEMI Alert sent @ 2047  Second perfect serve message STEMI Alert sent @ 2100  Received call back to confirm from cath lab @ 2101

## 2022-12-08 NOTE — ED NOTES
RN transporting pt upstairs given bedside report of medications given to pt.       Katerin Bee RN  12/07/22 7410

## 2022-12-08 NOTE — PROGRESS NOTES
Hospitalist ICU Progress Note    CC: STEMI (ST elevation myocardial infarction) [facility])    Hospital course:  74yo WM with PMHX sig for HLD and remote tob use presents with sudden onset of chest pain which did not improve. He had one episode a week prior which did improve. Found to have anterior STEMI in ED and had mid LAD 95% stenosis now s/p MANOLO x 1.  Pt does have abnormal lymphocyte population with 28% atypical cells - sent off for leukemia/lymphoma flow cytometry panel. Admit date: 12/7/2022  Days in hospital:  1    24 Hour Events: pt with minimal pain, ECHO shows reduced EF 73-31% with diastolic dysfunction and significant hypokinesis    Subjective: minimal chest pain    ROS:  A comprehensive review of systems was negative except for: denies shortness of breath or significant chest pain, he thinks he can feel the stent      Objective:    VITALS:  /85   Pulse 65   Temp 98.5 °F (36.9 °C) (Oral)   Resp 14   Ht 5' 11\" (1.803 m)   Wt 226 lb 13.7 oz (102.9 kg)   SpO2 96%   BMI 31.64 kg/m²     Gen: NAD  HEENT: NC/AT, moist mucous membranes, no oropharyngeal erythema or exudate    Neck: supple, trachea midline, no anterior cervical or SC LAD  Heart:  Normal s1/s2, RRR, no murmurs, gallops, or rubs. no leg edema  Lungs:  mostly clear bilaterally, no wheeze, no rales, no rhonchi, no crackles, no use of accessory muscles  Abd: bowel sounds present, soft, nontender, nondistended, no masses  Extrem:  No clubbing, cyanosis,  no edema, peripheral pulses 2+, brisk capillary refill  Skin: no rashes or lesions, normal color/perfusion  Psych:  A & O x3    Neuro: grossly intact, moves all four extremities. .      Radiology (personally reviewed by me):  ECHO:  Summary    The left ventricular systolic function is moderately reduced with an    estimated ejection fraction of 35-40%.     There is hypokinesis of the mid-anterior, anteroseptal, inferoseptal, apical    anterior, apical septal, and apical lateral walls. Normal left ventricle size and wall thickness. Grade II diastolic dysfunction with elevated LV filling pressures. Normal RV size and systolic function. Mild mitral regurgitation. Mild tricuspid regurgitation. Systolic pulmonic artery pressure (SPAP) is estimated at 40 mmHg, consistent    with mild pulmonary hypertension (Right atrial pressure of 3 mmHg). Last 3 Troponin:    Lab Results   Component Value Date/Time    TROPONINI 0.69 12/08/2022 04:25 AM    TROPONINI <0.01 12/07/2022 08:42 PM         Assessment:    Principal Problem:    STEMI (ST elevation myocardial infarction) ([facility] [address]  Active Problems:    Atypical lymphocytes present on peripheral blood smear    Hyperlipidemia    Former tobacco use    CAD S/P percutaneous coronary angioplasty    Acute systolic heart failure (HCC)    Ischemic cardiomyopathy  Resolved Problems:    * No resolved hospital problems. *      Plan:   STEMI - s/p stent to LAD - ASA and brilinta x 1 year  Atypical lymphocytes - sent off for flow cytometry  Ischemic CMO with chronic systolic and diastolic dysfunction - would benefit from ACE/ARB and beta blocker as BP allows - pt started on coreg and diovan        Prognosis:  Good    Code status:  Full code    DVT prophylaxis: Lovenox  GI prophylaxis: none  Antibiotic prophylaxis indicated:   no  VAP:   none  Nasal decolonization:  Bactroban  Diet:  ADULT DIET;  Regular; Low Fat/Low Chol/High Fiber/JONNY    Disposition:  Home    Medications:  Scheduled Meds:   atorvastatin  80 mg Oral Nightly    mupirocin   Nasal BID    ticagrelor  90 mg Oral BID    aspirin  81 mg Oral Daily    carvedilol  3.125 mg Oral BID WC    sodium chloride flush  5-40 mL IntraVENous 2 times per day    enoxaparin  30 mg SubCUTAneous BID       PRN Meds:  nitroGLYCERIN, perflutren lipid microspheres, sodium chloride flush, sodium chloride, potassium chloride **OR** potassium chloride, magnesium sulfate, sodium phosphate IVPB **OR** sodium phosphate IVPB **OR** sodium phosphate IVPB, ondansetron, acetaminophen **OR** acetaminophen, melatonin, calcium carbonate    IV:   eptifibatide 2 mcg/kg/min (12/08/22 0527)    sodium chloride           Intake/Output Summary (Last 24 hours) at 12/8/2022 0847  Last data filed at 12/8/2022 0527  Gross per 24 hour   Intake 269.24 ml   Output 825 ml   Net -555.76 ml       Results:  CBC:   Recent Labs     12/07/22 2059   WBC 15.7*   HGB 16.2   HCT 48.5   MCV 88.1        BMP:   Recent Labs     12/07/22 2042 12/08/22  0425    138   K 3.8 4.6    105   CO2 28 27   BUN 13 11   CREATININE 1.1 0.9     Mag: No results for input(s): MAG in the last 72 hours. LIVER PROFILE:   Recent Labs     12/07/22 2042   AST 24   ALT 22   BILITOT 0.4   ALKPHOS 112     PT/INR: No results for input(s): PROTIME, INR in the last 72 hours. APTT:   Recent Labs     12/07/22 2042   APTT 25.7     UA:No results for input(s): NITRITE, COLORU, PHUR, LABCAST, WBCUA, RBCUA, MUCUS, TRICHOMONAS, YEAST, BACTERIA, CLARITYU, SPECGRAV, LEUKOCYTESUR, UROBILINOGEN, BILIRUBINUR, BLOODU, GLUCOSEU, AMORPHOUS in the last 72 hours.     Invalid input(s): KETONESU    ABG: No results found for: PHART, PH, [id number], PCO2, PO2ART, PO2, LKG5AKE, HCO3, BEART, BE, THGBART, THB, KIE2DFK, N5ZUPSBA, O2SAT    Lab Results   Component Value Date    CALCIUM 8.7 12/08/2022             Electronically signed by Nola Su MD on 12/8/2022 at 8:47 AM

## 2022-12-08 NOTE — PROGRESS NOTES
[facility]   PROGRESS NOTE  [phone]      Attending Physician: Cricket Meléndez MD  Reason for Consultation/Chief Complaint:  Acute anterior ST elevation MI     Subjective     Patient remained hemodynamically stable overnight. Denies recurrent chest pain or shortness of breath. CURRENT Medications:  atorvastatin (LIPITOR) tablet 80 mg, Nightly  mupirocin (BACTROBAN) 2 % ointment, BID  nitroGLYCERIN (NITROSTAT) SL tablet 0.4 mg, Q5 Min PRN  eptifibatide (INTEGRILIN) 0.75 mg/mL infusion, Continuous  ticagrelor (BRILINTA) tablet 90 mg, BID  aspirin chewable tablet 81 mg, Daily  carvedilol (COREG) tablet 3.125 mg, BID WC  perflutren lipid microspheres (DEFINITY) injection 1.5 mL, ONCE PRN  sodium chloride flush 0.9 % injection 5-40 mL, 2 times per day  sodium chloride flush 0.9 % injection 5-40 mL, PRN  0.9 % sodium chloride infusion, PRN  potassium chloride 20 mEq/50 mL IVPB (Central Line), PRN   Or  potassium chloride 10 mEq/100 mL IVPB (Peripheral Line), PRN  magnesium sulfate 1000 mg in dextrose 5% 100 mL IVPB, PRN  sodium phosphate 10 mmol in sodium chloride 0.9 % 250 mL IVPB, PRN   Or  sodium phosphate 15 mmol in sodium chloride 0.9 % 250 mL IVPB, PRN   Or  sodium phosphate 20 mmol in sodium chloride 0.9 % 500 mL IVPB, PRN  enoxaparin Sodium (LOVENOX) injection 30 mg, BID  ondansetron (ZOFRAN) injection 4 mg, Q6H PRN  acetaminophen (TYLENOL) tablet 650 mg, Q4H PRN   Or  acetaminophen (TYLENOL) suppository 650 mg, Q4H PRN  melatonin tablet 3 mg, Nightly PRN  calcium carbonate (TUMS) chewable tablet 1,000 mg, TID PRN        Allergies:  Patient has no known allergies. Review of Systems:   Negative except as noted above. Objective   PHYSICAL EXAM:    Vitals:    12/08/22 0700   BP: 111/79   Pulse: 65   Resp: 19   Temp:    SpO2: 93%    Weight: 226 lb 13.7 oz (102.9 kg)      General: Adult male lying in bed in no acute distress.   HEENT: Normocephalic, atraumatic, non-icteric, hearing intact, nares normal, mucous membranes moist.  Neck: No appreciable JVD. Heart: Regular rate and rhythm. Normal S1 and S2. No murmurs, gallops or rubs. Lungs: Normal respiratory effort. Mild bilaterally crackles noted. No wheezes or rhonchi. Abdomen: Soft, non-tender. Normoactive bowel sounds. No masses or organomegaly. Skin: No rashes, wounds, or lesions. Pulses: 2+ and symmetric. Extremities: Right radial artery access site is soft, non-tender, and without evidence of hematoma. No clubbing, cyanosis, or edema. Psych: Normal mood and affect. Neuro: Alert and oriented to person, place, and time. No focal deficits noted. Labs   CBC:   Lab Results   Component Value Date/Time    WBC 15.7 12/07/2022 08:59 PM    RBC 5.51 12/07/2022 08:59 PM    HGB 16.2 12/07/2022 08:59 PM    HCT 48.5 12/07/2022 08:59 PM    MCV 88.1 12/07/2022 08:59 PM    RDW 13.4 12/07/2022 08:59 PM     12/07/2022 08:59 PM     CMP:  Lab Results   Component Value Date/Time     12/08/2022 04:25 AM    K 4.6 12/08/2022 04:25 AM    K 3.8 12/07/2022 08:42 PM     12/08/2022 04:25 AM    CO2 27 12/08/2022 04:25 AM    BUN 11 12/08/2022 04:25 AM    CREATININE 0.9 12/08/2022 04:25 AM    GFRAA 57 09/28/2021 06:51 AM    AGRATIO 0.9 12/07/2022 08:42 PM    LABGLOM >60 12/08/2022 04:25 AM    GLUCOSE 110 12/08/2022 04:25 AM    PROT 8.3 12/07/2022 08:42 PM    CALCIUM 8.7 12/08/2022 04:25 AM    BILITOT 0.4 12/07/2022 08:42 PM    ALKPHOS 112 12/07/2022 08:42 PM    AST 24 12/07/2022 08:42 PM    ALT 22 12/07/2022 08:42 PM     PT/INR:  No results found for: PTINR  HgBA1c:  Lab Results   Component Value Date    LABA1C 6.0 12/01/2021     Lab Results   Component Value Date    TROPONINI 0.69 ([facility]) 12/08/2022         Cardiac Data      Echo:   TTE 12/8/22:  Conclusions   Summary   The left ventricular systolic function is moderately reduced with an   estimated ejection fraction of 35-40%.    There is hypokinesis of the mid-anterior, anteroseptal, inferoseptal, apical   anterior, apical septal, and apical lateral walls. Normal left ventricle size and wall thickness. Grade II diastolic dysfunction with elevated LV filling pressures. Normal RV size and systolic function. Mild mitral regurgitation. Mild tricuspid regurgitation. Systolic pulmonic artery pressure (SPAP) is estimated at 40 mmHg, consistent   with mild pulmonary hypertension (Right atrial pressure of 3 mmHg). Stress Test: N/A     Cath:  [facility] 12/7/2022:  Findings:  Hemodynamics:              A. Opening arterial pressure: 107/63 (84) mmHg              B. LVEDP: 22 mmHg     2. Coronary anatomy:  A. Left main artery: The left main artery bifurcates into the left anterior descending artery and left circumflex artery. The left main artery has minor luminal irregularities. B. Left anterior descending artery: Transapical vessel which gives rise to a large, high origin early bifurcating diagonal artery. The proximal LAD has a 30% stenosis. The mid-LAD has sequential 40% and 95% stenoses. The distal LAD has a 40% stenosis and there is another focal 70% stenosis in the small caliber apical LAD. The diagonal artery is a large, early bifurcating vessel with a high origin. The more superior branch of the diagonal artery has a 30% stenosis. C. Left circumflex artery: Non-dominant vessel that gives rise to 2 obtuse marginal arteries and a large left posterolateral branch. The LCx has 20% ostial and 20% proximal stenoses. The OM1 is a very small vessel with a high origin and has mild disease. The OM2 is a medium caliber branching vessel with minor luminal irregularities. The left posterolateral branch is a large vessel with a 30% ostial stenosis. D. Right coronary artery: Co-dominant vessel. The RCA has a 30% proximal/mid-vessel stenosis. The remainder of the vessel has minor luminal irregularities. 3. Left ventriculography;  A.  LVEF 40% with hypokinesis of the anterior and apical walls.  B. 1+ mitral regurgitation. Results of Intervention (PCI of mid-LAD):  Pre - 95% stenosis, FABIAN 2 flow  Post - 10% stenosis, FABIAN 3 flow    Impression:  1. Acute anterior ST elevation MI.  2. Severe single-vessel coronary artery disease with mid-LAD culprit lesion. 3. Successful PCI of mid-LAD with Hubertus Lander 2.75 x 38 mm MANOLO, tapered to ~3.6 mm.  4. There is a focal 70% residual stenosis in the small caliber apical LAD for which medical therapy will be recommended  5. Ischemic cardiomyopathy with LVEF 40% on left ventriculography. 6. LVEDP 22 mmHg. 7. Mild mitral ergurgitation. 8. The patient was difficult to sedate during the procedure and was frequently moving his right arm and attempting to sit up on the table. Other Imaging:  Chest X-ray 12/7/2022:  Impression   Diffuse interstitial infiltrates seen throughout the lungs which may represent pulmonary edema or interstitial pneumonia. Assessment:      1. CAD/Acute anterior STEMI - S/p PCI of mid-LAD with Hubertus Lander 2.75 x 38 mm MANOLO, tapered to ~3.6 mm on 12/7/22.    2. Acute LV systolic heart failure/ischemic cardiomyopathy - LVEF 35-40% on TTE. Currently appears mildly hypervolemic with bilateral pulmonary crackles on exam.  LVEDP 22 mmHg on LHC. 3. Hyperlipidemia  4. Former tobacco use      Plan:     1. Continue IV Integrilin infusion x12 hours. 2. Continue aspirin 81 mg daily, Brilinta 90 mg BID, atorvastatin 80 mg qHS, and coreg 3.125 mg BID. Brilinta should be continued through at least 12/7/23. 3. Start valsartan 20 mg daily. 4. Diurese with 20 mg IV lasix. 5. Ambulate as tolerated. 6. Continue to monitor on telemetry and repeat EKG for any rhythm changes or new episodes of chest pain. 7. Trend troponins until peak. 8. If remains stable over next 24 hours, can likely discharge home tomorrow afternoon and will arrange for close follow-up with [facility] in 1-2 weeks.     Thank you for allowing us to participate in the care of Jaja Ferreira. Please call me with any questions [phone]. Will Lyons DO  [facility]  [phone] [facility]  [phone] [address] [place] [address]  12/8/2022 8:43 AM    I will address the patient's cardiac risk factors and adjusted pharmacologic treatment as needed. In addition, I have reinforced the need for patient directed risk factor modification. All questions and concerns were addressed to the patient/family. Alternatives to my treatment were discussed. The note was completed using EMR. Every effort was made to ensure accuracy; however, inadvertent computerized transcription errors may be present.

## 2022-12-08 NOTE — PROGRESS NOTES
Labels and Orders are correct and during bedside report all IV tubing has been physically traced from labeled bag, to channel, to IV site and verified by oncoming and off going nurse.        Report given to [facility]  Cath Lab RNElectronically signed by Angelina Young RN on 12/7/22 at 10:28 PM EST    Accepting RN Electronically signed by Abel Petit RN on 12/7/22 at 10:57 PM EST

## 2022-12-08 NOTE — PROGRESS NOTES
Patient brought to ICU from cath lab following stent placement. Four eyes skin assessment completed as well as CHG bath. Integrilin infusing through peripheral IV, R. Radial TR band intact and inflated with 13ml of air. Second bolus of Integrilin (23.7ml) given per cath lab orders. Vital signs stable and patient resting with ease. Call light education provided and placed within reach.     Florinda Schirmer RN

## 2022-12-08 NOTE — PROCEDURES
CARDIAC CATHETERIZATION REPORT    Date of Procedure: 12/7/2022  : Zuri Lujan DO  Primary Indication: Acute anterior ST elevation MI    Procedures Performed:  1. Coronary angiography  2. Left heart catheterization  3. Left ventriculography  4. PCI of mid-LAD with MANOLO  5. Moderate conscious sedation    Procedural Details:  Access: Local anesthetic was given and access was obtained in the right radial artery using a micropuncture technique and a 6F Terumo Slender Sheath was placed without difficulty. Diagnostic: A 5F JR4 catheter was used to perform selective right coronary angiography. A 6F XB3.0 guide catheter was used to perform selective left coronary angiography. A 5F pigtail catheter was used to perform the left heart catheterization and left ventriculography. No significant gradient was observed on pull-back of the catheter across the aortic valve. Intervention: Therapeutic ACT was achieved with the administration of IV heparin. Using a 6F XB3.0 guide catheter, a 0.014 Prowater wire was advanced into the distal LAD. The mid-LAD was pre-dilated with a 2.0 x 12 mm balloon and then again with a 2.5 x 20 mm balloon. The mid-LAD was then stented with an Ulysses Ashe 2.75 x 38 mm MANOLO. The stent was then serially post-dilated in a tapered fashion with non-compliant 2.75 x 20 mm, non-compliant 3.0 x 20 mm, and non-compliant 3.5 x 12 mm balloons a high pressure. On repeat angiography there still appeared to be some residual stenosis at the site of the culprit lesion and this area was dilated again with prolonged, high-pressure inflation using a non-compliant 3.0 x 20 mm balloon. Final angiography showed 10% residual stenosis in the mid-LAD with FABAIN 3 flow and no evidence of dissection. There is a focal 70% residual stenosis in the small caliber apical LAD for which medical therapy will be recommended.   The patient was difficult to sedate during the procedure and was frequently moving his right arm and attempting to sit up on the table. Hemostasis: At the end of the procedure, the radial sheath was removed and a hemoband was placed over the arteriotomy site and filled with air to maintain hemostasis. Findings:  Hemodynamics:     A. Opening arterial pressure: 107/63 (84) mmHg      B. LVEDP: 22 mmHg    2. Coronary anatomy:  A. Left main artery: The left main artery bifurcates into the left anterior descending artery and left circumflex artery. The left main artery has minor luminal irregularities. B. Left anterior descending artery: Transapical vessel which gives rise to a large, high origin early bifurcating diagonal artery. The proximal LAD has a 30% stenosis. The mid-LAD has sequential 40% and 95% stenoses. The distal LAD has a 40% stenosis and there is another focal 70% stenosis in the small caliber apical LAD. The diagonal artery is a large, early bifurcating vessel with a high origin. The more superior branch of the diagonal artery has a 30% stenosis. C. Left circumflex artery: Non-dominant vessel that gives rise to 2 obtuse marginal arteries and a large left posterolateral branch. The LCx has 20% ostial and 20% proximal stenoses. The OM1 is a very small vessel with a high origin and has mild disease. The OM2 is a medium caliber branching vessel with minor luminal irregularities. The left posterolateral branch is a large vessel with a 30% ostial stenosis. D. Right coronary artery: Co-dominant vessel. The RCA has a 30% proximal/mid-vessel stenosis. The remainder of the vessel has minor luminal irregularities. 3. Left ventriculography;  A. LVEF 40% with hypokinesis of the anterior and apical walls. B. 1+ mitral regurgitation. Results of Intervention (PCI of mid-LAD):  Pre - 95% stenosis, FABIAN 2 flow  Post - 10% stenosis, FABIAN 3 flow    Technical Factors:  Complications:  None.   Estimated blood loss: Minimal.  Radiation:  Air kerma 1,803 mGy and 18.5 minutes of

## 2022-12-08 NOTE — DISCHARGE INSTRUCTIONS
FOLLOW-UP APPOINTMENTS    [facility] OFFICE - Follow-up appointment on Tuesday December 20th (12/20/2022) at 10:30AM with Dr. Mickey Yarbrough DO, [facility]. [facility],  [facility], [address] [place] [address], [address] [place] [address]. Office #: [phone]. If you are unable to make this appointment, please call to reschedule. Directions to [place] [address] towards [place]. [address] [facility] exit. Right off exit. Cross over [employer]. Right on [address]. Left into hospital. Follow the signs to the emergency room ( turn left toward the Emergency room). Go right at the first stop sign. Just past the Emergency room at the second stop sign turn right and go up the ramp and park on the top level if possible. Go in the glass doors of the [facility] we on the top level of the garage [address]. As soon as you get in the door turn left and our office is the one with the glass doors.

## 2022-12-08 NOTE — CARE COORDINATION
CASE MANAGEMENT INITIAL ASSESSMENT      Reviewed chart and completed assessment with patient:at bedside  Family present: yes  Explained Case Management role/services. yes    Primary contact information:    Health Care Decision Maker :   Primary Decision Maker: Jessi Hutson - Child - [phone]    Secondary Decision Maker: Michell Hudson - Child - [phone]          Can this person be reached and be able to respond quickly, such as within a few minutes or hours? Yes      Admit date/status:12/7 IP  Diagnosis:STEMI   Is this a Readmission?:  No      Insurance:[facility]   Precert required for SNF: Yes       3 night stay required: No    Living arrangements, Adls, care needs, prior to admission:Lives alone in ranch home. IPTA- no DME or services. Pt drives    Durable Medical Equipment at home:  Walker__Cane__RTS__ BSC__Shower Chair__  02__ HHN__ CPAP__  BiPap__  Hospital Bed__ W/C___ Other_none____    Services in the home and/or outpatient, prior to admission:none    Current PCP: RICHIE Bates                               Medications: Prescription coverage? Yes Will pt require financial assistance with medications No     Transportation needs: family         PT/OT recs:none    Hospital Exemption Notification (HEN):na    Barriers to discharge:none known    Plan/comments:plans home w NN- family and friend support     ECOC on chart for MD signature Shery Najjar, RN

## 2022-12-08 NOTE — CONSULTS
[address] [facility]  [phone]      Attending Physician: William Simmons MD  Reason for Consultation/Chief Complaint: Acute anterior ST elevation MI    Subjective   History of Present Illness:  Cristina Gay is a 78 y.o. male with a history of hyperlipidemia and tobacco use who presented with chest pain. The patient reports that earlier this evening after eating dinner, he developed sharp mid-sternal chest pain and later vomited. He denies any associated shortness of breath or diaphoresis. He says that he did have an episode of more mild chest pain approximately 2 weeks ago. On arrival to the ED, the patient was mildly tachycardic with initial SBP in the 180s. EKG showed sinus rhythm with PVCs 2-3 mm ST elevation in the anterior leads. His chest pain improved after taking an SL nitroglycerin and repeat BP was 141/82. His ST elevation appeared to have improved on his repeat tracing as well. CODE STEMI was activated and the patient was taken to the cardiac cath lab for emergent invasive coronary angiography and percutaneous coronary intervention. Past Medical History:  Father - Heart disease    Surgical History:   has no past surgical history on file. Social History:   reports that he quit smoking about 16 years ago. His smoking use included cigarettes. He has a 25.00 pack-year smoking history. He has never used smokeless tobacco. He reports that he does not drink alcohol and does not use drugs. Family History:  family history includes Dementia in his mother; Diabetes in his brother, brother, and maternal grandfather. Home Medications:  Were reviewed and are listed in nursing record and/or below  Prior to Admission medications    Medication Sig Start Date End Date Taking?  Authorizing Provider   atorvastatin (LIPITOR) 20 MG tablet TAKE ONE TABLET BY MOUTH DAILY 7/19/22   Romina Altman,         CURRENT Medications:  nitroGLYCERIN (NITROSTAT) SL tablet 0.4 mg, Q5 Min PRN        Allergies:  Patient has no known allergies. Review of Systems:   A 14 point review of symptoms was completed. Pertinent positives were identified in the HPI. All other review of symptoms negative unless otherwise noted below. Objective   PHYSICAL EXAM:    Vitals:    12/07/22 2101   BP: (!) 141/82   Pulse: 80   Resp: 22   Temp: 97.9F   SpO2: 97%    Weight: 218 lb (98.9 kg)       General: Adult male lying in bed. HEENT: Normocephalic, atraumatic, non-icteric, hearing intact, nares normal, mucous membranes moist.  Neck: Supple, trachea midline. No adenopathy. No thyromegaly. No JVD. Heart: Regular rate and rhythm. Normal S1 and S2. No murmurs, gallops or rubs. Lungs: Normal respiratory effort. Clear to auscultation bilaterally. No wheezes, rales, or rhonchi. Abdomen: Soft, non-tender. Normoactive bowel sounds. No masses or organomegaly. Skin: No rashes, wounds, or lesions. Pulses: 2+ and symmetric. Extremities: No clubbing, cyanosis, or edema. Musculoskeletal: Spontaneously moves all four extremities. Psych: Normal mood and affect. Neuro: Alert and oriented to person, place, and time. No focal deficits noted.       Labs   CBC:   Lab Results   Component Value Date/Time    WBC 14.5 09/28/2021 06:51 AM    RBC 5.74 09/28/2021 06:51 AM    HGB 17.1 09/28/2021 06:51 AM    HCT 50.3 09/28/2021 06:51 AM    MCV 87.7 09/28/2021 06:51 AM    RDW 13.5 09/28/2021 06:51 AM     09/28/2021 06:51 AM     CMP:  Lab Results   Component Value Date/Time     09/28/2021 06:51 AM    K 4.2 09/28/2021 06:51 AM     09/28/2021 06:51 AM    CO2 25 09/28/2021 06:51 AM    BUN 17 09/28/2021 06:51 AM    CREATININE 1.5 09/28/2021 06:51 AM    GFRAA 57 09/28/2021 06:51 AM    AGRATIO 1.0 09/28/2021 06:51 AM    LABGLOM 47 09/28/2021 06:51 AM    GLUCOSE 155 09/28/2021 06:51 AM    PROT 8.1 09/28/2021 06:51 AM    CALCIUM 9.6 09/28/2021 06:51 AM    BILITOT 0.5 09/28/2021 06:51 AM    ALKPHOS 81 09/28/2021 06:51 AM    AST 22 09/28/2021 06:51 AM    ALT 18 09/28/2021 06:51 AM     PT/INR:  No results found for: PTINR  HgBA1c:  Lab Results   Component Value Date    LABA1C 6.0 12/01/2021     No results found for: CKTOTAL, CKMB, CKMBINDEX, TROPONINI      Cardiac Data     Last EKG: Sinus rhythm with PVCs, acute anterior ST elevation MI    Echo: N/A    Stress Test: N/A    Cath: N/A      Assessment:      1. Acute anterior ST elevation MI  2. Hyperlipidemia  3. Former tobacco use        Plan:     Load with aspirin 324 mg, Brilinta 180 mg, and 4,000 units IV heparin. Proceed to cardiac cath lab for emergent invasive coronary angiography and percutaneous coronary intervention. Critical Care   Due to the high probability of clinically significant life threating deterioration of the patient's condition that required my urgent intervention, a total critical care time of >35 minutes was used. This time excludes any time that may have been spent performing procedures. This includes, but is not limited to, vital sign monitoring, telemetry monitoring, continuous pulse oximety, IV medication, clinical response to the IV medications, documentation time, consultation time, interpretation of lab data, review of nursing notes and old record review. Thank you for allowing us to participate in the care of Jeffery Tineo. Please call me with any questions [phone]. Jillian Carrel, DO  [facility]  [phone] [facility]  [phone] [address] [place] [address]  12/7/2022 9:07 PM      I will address the patient's cardiac risk factors and adjusted pharmacologic treatment as needed. In addition, I have reinforced the need for patient directed risk factor modification. All questions and concerns were addressed to the patient/family. Alternatives to my treatment were discussed. The note was completed using EMR.  Every effort was made to ensure accuracy; however, inadvertent computerized transcription errors may be

## 2022-12-09 VITALS
DIASTOLIC BLOOD PRESSURE: 91 MMHG | HEIGHT: 71 IN | SYSTOLIC BLOOD PRESSURE: 139 MMHG | BODY MASS INDEX: 31.36 KG/M2 | TEMPERATURE: 98.6 F | WEIGHT: 224 LBS | RESPIRATION RATE: 18 BRPM | OXYGEN SATURATION: 95 % | HEART RATE: 69 BPM

## 2022-12-09 LAB
ANION GAP SERPL CALCULATED.3IONS-SCNC: 10 MMOL/L (ref 3–16)
BUN BLDV-MCNC: 13 MG/DL (ref 7–20)
CALCIUM SERPL-MCNC: 8.5 MG/DL (ref 8.3–10.6)
CHLORIDE BLD-SCNC: 101 MMOL/L (ref 99–110)
CO2: 25 MMOL/L (ref 21–32)
CREAT SERPL-MCNC: 1.2 MG/DL (ref 0.8–1.3)
ESTIMATED AVERAGE GLUCOSE: 128.4 MG/DL
GFR SERPL CREATININE-BSD FRML MDRD: >60 ML/MIN/{1.73_M2}
GLUCOSE BLD-MCNC: 113 MG/DL (ref 70–99)
HBA1C MFR BLD: 6.1 %
HEMATOLOGY PATH CONSULT: NORMAL
MAGNESIUM: 1.8 MG/DL (ref 1.8–2.4)
POTASSIUM REFLEX MAGNESIUM: 3.7 MMOL/L (ref 3.5–5.1)
SODIUM BLD-SCNC: 136 MMOL/L (ref 136–145)

## 2022-12-09 PROCEDURE — 6370000000 HC RX 637 (ALT 250 FOR IP): Performed by: INTERNAL MEDICINE

## 2022-12-09 PROCEDURE — 36415 COLL VENOUS BLD VENIPUNCTURE: CPT

## 2022-12-09 PROCEDURE — 2580000003 HC RX 258: Performed by: INTERNAL MEDICINE

## 2022-12-09 PROCEDURE — 80048 BASIC METABOLIC PNL TOTAL CA: CPT

## 2022-12-09 PROCEDURE — 83735 ASSAY OF MAGNESIUM: CPT

## 2022-12-09 PROCEDURE — 99233 SBSQ HOSP IP/OBS HIGH 50: CPT | Performed by: INTERNAL MEDICINE

## 2022-12-09 PROCEDURE — 6360000002 HC RX W HCPCS: Performed by: INTERNAL MEDICINE

## 2022-12-09 RX ORDER — POTASSIUM CHLORIDE 20 MEQ/1
40 TABLET, EXTENDED RELEASE ORAL ONCE
Status: COMPLETED | OUTPATIENT
Start: 2022-12-09 | End: 2022-12-09

## 2022-12-09 RX ORDER — VALSARTAN 80 MG/1
20 TABLET ORAL DAILY
Status: DISCONTINUED | OUTPATIENT
Start: 2022-12-10 | End: 2022-12-09 | Stop reason: HOSPADM

## 2022-12-09 RX ORDER — ATORVASTATIN CALCIUM 40 MG/1
40 TABLET, FILM COATED ORAL NIGHTLY
Qty: 30 TABLET | Refills: 0 | Status: SHIPPED | OUTPATIENT
Start: 2022-12-09

## 2022-12-09 RX ORDER — ASPIRIN 81 MG/1
81 TABLET, CHEWABLE ORAL DAILY
Qty: 30 TABLET | Refills: 0 | Status: SHIPPED | OUTPATIENT
Start: 2022-12-10

## 2022-12-09 RX ORDER — VALSARTAN 40 MG/1
20 TABLET ORAL DAILY
Qty: 15 TABLET | Refills: 0 | Status: SHIPPED | OUTPATIENT
Start: 2022-12-10

## 2022-12-09 RX ORDER — FUROSEMIDE 20 MG/1
20 TABLET ORAL DAILY
Qty: 30 TABLET | Refills: 0 | Status: SHIPPED | OUTPATIENT
Start: 2022-12-10

## 2022-12-09 RX ORDER — CARVEDILOL 3.12 MG/1
3.12 TABLET ORAL 2 TIMES DAILY WITH MEALS
Qty: 60 TABLET | Refills: 0 | Status: SHIPPED | OUTPATIENT
Start: 2022-12-09

## 2022-12-09 RX ORDER — FUROSEMIDE 20 MG/1
20 TABLET ORAL DAILY
Status: DISCONTINUED | OUTPATIENT
Start: 2022-12-09 | End: 2022-12-09 | Stop reason: HOSPADM

## 2022-12-09 RX ADMIN — FUROSEMIDE 20 MG: 20 TABLET ORAL at 10:05

## 2022-12-09 RX ADMIN — MUPIROCIN: 20 OINTMENT TOPICAL at 08:46

## 2022-12-09 RX ADMIN — Medication 10 ML: at 08:46

## 2022-12-09 RX ADMIN — VALSARTAN 20 MG: 80 TABLET, FILM COATED ORAL at 08:42

## 2022-12-09 RX ADMIN — TICAGRELOR 90 MG: 90 TABLET ORAL at 08:42

## 2022-12-09 RX ADMIN — ENOXAPARIN SODIUM 30 MG: 100 INJECTION SUBCUTANEOUS at 08:42

## 2022-12-09 RX ADMIN — CARVEDILOL 3.12 MG: 3.12 TABLET, FILM COATED ORAL at 08:42

## 2022-12-09 RX ADMIN — ASPIRIN 81 MG: 81 TABLET, CHEWABLE ORAL at 08:42

## 2022-12-09 RX ADMIN — POTASSIUM CHLORIDE 40 MEQ: 20 TABLET, EXTENDED RELEASE ORAL at 09:22

## 2022-12-09 NOTE — PROGRESS NOTES
[facility]   PROGRESS NOTE  [phone]      Attending Physician: Lauren Giron MD  Reason for Consultation/Chief Complaint:  Acute anterior ST elevation MI     Subjective     Patient reports that he is feeling well this morning and denies chest pain and shortness of breath. Had some mildly low BP readings recorded overnight, but nursing staff reports that cuff being used was too large and repeat BP obtained this morning was 139/91. CURRENT Medications:  potassium chloride (KLOR-CON M) extended release tablet 40 mEq, Once  [START ON 12/10/2022] valsartan (DIOVAN) tablet 20 mg, Daily  furosemide (LASIX) tablet 20 mg, Daily  atorvastatin (LIPITOR) tablet 80 mg, Nightly  mupirocin (BACTROBAN) 2 % ointment, BID  nitroGLYCERIN (NITROSTAT) SL tablet 0.4 mg, Q5 Min PRN  ticagrelor (BRILINTA) tablet 90 mg, BID  aspirin chewable tablet 81 mg, Daily  carvedilol (COREG) tablet 3.125 mg, BID WC  perflutren lipid microspheres (DEFINITY) injection 1.5 mL, ONCE PRN  sodium chloride flush 0.9 % injection 5-40 mL, 2 times per day  sodium chloride flush 0.9 % injection 5-40 mL, PRN  0.9 % sodium chloride infusion, PRN  potassium chloride 20 mEq/50 mL IVPB (Central Line), PRN   Or  potassium chloride 10 mEq/100 mL IVPB (Peripheral Line), PRN  magnesium sulfate 1000 mg in dextrose 5% 100 mL IVPB, PRN  sodium phosphate 10 mmol in sodium chloride 0.9 % 250 mL IVPB, PRN   Or  sodium phosphate 15 mmol in sodium chloride 0.9 % 250 mL IVPB, PRN   Or  sodium phosphate 20 mmol in sodium chloride 0.9 % 500 mL IVPB, PRN  enoxaparin Sodium (LOVENOX) injection 30 mg, BID  ondansetron (ZOFRAN) injection 4 mg, Q6H PRN  acetaminophen (TYLENOL) tablet 650 mg, Q4H PRN   Or  acetaminophen (TYLENOL) suppository 650 mg, Q4H PRN  melatonin tablet 3 mg, Nightly PRN  calcium carbonate (TUMS) chewable tablet 1,000 mg, TID PRN      Allergies:  Patient has no known allergies.      Review of Systems:   Negative except as noted above.    Objective   PHYSICAL EXAM:    Vitals:    12/09/22 0900   BP: (!) 139/91   Pulse: 69   Resp: 18   Temp: 98.6F   SpO2: 95%    Weight: 224 lb (101.6 kg)      General: Adult male lying in bed in no acute distress. HEENT: Normocephalic, atraumatic, non-icteric, hearing intact, nares normal, mucous membranes moist.  Neck: No appreciable JVD. Heart: Regular rate and rhythm. Normal S1 and S2. No murmurs, gallops or rubs. Lungs: Normal respiratory effort. Mild bilateral crackles noted. No wheezes or rhonchi. Abdomen: Soft, non-tender. Normoactive bowel sounds. No masses or organomegaly. Skin: No rashes. Pulses: 2+ and symmetric. Extremities: Right radial artery access site is soft, non-tender, and without evidence of hematoma. No clubbing, cyanosis, or edema. Psych: Normal mood and affect. Neuro: Alert and oriented to person, place, and time. No focal deficits noted.       Labs   CBC:   Lab Results   Component Value Date/Time    WBC 15.7 12/07/2022 08:59 PM    RBC 5.51 12/07/2022 08:59 PM    HGB 16.2 12/07/2022 08:59 PM    HCT 48.5 12/07/2022 08:59 PM    MCV 88.1 12/07/2022 08:59 PM    RDW 13.4 12/07/2022 08:59 PM     12/07/2022 08:59 PM     CMP:  Lab Results   Component Value Date/Time     12/09/2022 04:36 AM    K 3.7 12/09/2022 04:36 AM     12/09/2022 04:36 AM    CO2 25 12/09/2022 04:36 AM    BUN 13 12/09/2022 04:36 AM    CREATININE 1.2 12/09/2022 04:36 AM    GFRAA 57 09/28/2021 06:51 AM    AGRATIO 0.9 12/07/2022 08:42 PM    LABGLOM >60 12/09/2022 04:36 AM    GLUCOSE 113 12/09/2022 04:36 AM    PROT 8.3 12/07/2022 08:42 PM    CALCIUM 8.5 12/09/2022 04:36 AM    BILITOT 0.4 12/07/2022 08:42 PM    ALKPHOS 112 12/07/2022 08:42 PM    AST 24 12/07/2022 08:42 PM    ALT 22 12/07/2022 08:42 PM     PT/INR:  No results found for: PTINR  HgBA1c:  Lab Results   Component Value Date    LABA1C 6.0 12/01/2021     Lab Results   Component Value Date    TROPONINI 0.43 (H) 12/08/2022         Cardiac Data Echo:   TTE 12/8/22:  Conclusions   Summary   The left ventricular systolic function is moderately reduced with an   estimated ejection fraction of 35-40%. There is hypokinesis of the mid-anterior, anteroseptal, inferoseptal, apical   anterior, apical septal, and apical lateral walls. Normal left ventricle size and wall thickness. Grade II diastolic dysfunction with elevated LV filling pressures. Normal RV size and systolic function. Mild mitral regurgitation. Mild tricuspid regurgitation. Systolic pulmonic artery pressure (SPAP) is estimated at 40 mmHg, consistent   with mild pulmonary hypertension (Right atrial pressure of 3 mmHg). Stress Test: N/A     Cath:  [facility] 12/7/2022:  Findings:  Hemodynamics:              A. Opening arterial pressure: 107/63 (84) mmHg              B. LVEDP: 22 mmHg     2. Coronary anatomy:  A. Left main artery: The left main artery bifurcates into the left anterior descending artery and left circumflex artery. The left main artery has minor luminal irregularities. B. Left anterior descending artery: Transapical vessel which gives rise to a large, high origin early bifurcating diagonal artery. The proximal LAD has a 30% stenosis. The mid-LAD has sequential 40% and 95% stenoses. The distal LAD has a 40% stenosis and there is another focal 70% stenosis in the small caliber apical LAD. The diagonal artery is a large, early bifurcating vessel with a high origin. The more superior branch of the diagonal artery has a 30% stenosis. C. Left circumflex artery: Non-dominant vessel that gives rise to 2 obtuse marginal arteries and a large left posterolateral branch. The LCx has 20% ostial and 20% proximal stenoses. The OM1 is a very small vessel with a high origin and has mild disease. The OM2 is a medium caliber branching vessel with minor luminal irregularities. The left posterolateral branch is a large vessel with a 30% ostial stenosis.   D. Right coronary artery: Co-dominant vessel. The RCA has a 30% proximal/mid-vessel stenosis. The remainder of the vessel has minor luminal irregularities. 3. Left ventriculography;  A. LVEF 40% with hypokinesis of the anterior and apical walls. B. 1+ mitral regurgitation. Results of Intervention (PCI of mid-LAD):  Pre - 95% stenosis, FABIAN 2 flow  Post - 10% stenosis, FABIAN 3 flow    Impression:  1. Acute anterior ST elevation MI.  2. Severe single-vessel coronary artery disease with mid-LAD culprit lesion. 3. Successful PCI of mid-LAD with Shirley Dunnellon 2.75 x 38 mm MANOLO, tapered to ~3.6 mm.  4. There is a focal 70% residual stenosis in the small caliber apical LAD for which medical therapy will be recommended  5. Ischemic cardiomyopathy with LVEF 40% on left ventriculography. 6. LVEDP 22 mmHg. 7. Mild mitral ergurgitation. 8. The patient was difficult to sedate during the procedure and was frequently moving his right arm and attempting to sit up on the table. Other Imaging:  Chest X-ray 12/7/2022:  Impression   Diffuse interstitial infiltrates seen throughout the lungs which may represent pulmonary edema or interstitial pneumonia. Assessment:      1. CAD/Acute anterior STEMI - S/p PCI of mid-LAD with Ulysses Dunnellon 2.75 x 38 mm MANOLO, tapered to ~3.6 mm on 12/7/22.    2. Acute LV systolic heart failure/ischemic cardiomyopathy - LVEF 35-40% on TTE. Still has some mild bilateral crackles on exam.  LVEDP 22 mmHg on LHC. 3. Hyperlipidemia  4. Former tobacco use      Plan:     1. Continue aspirin 81 mg daily, Brilinta 90 mg BID, atorvastatin 80 mg qHS, coreg 3.125 mg BID, and valsartan 20 mg daily. Brilinta should be continued through at least 12/7/23.   2. Note that patient had some mildly low BP readings recorded overnight, but nursing staff reports that cuff being used was too large and repeat BP obtained this morning was actually mildly elevated, so will plan to continue on current dose of valsartan which was just

## 2022-12-09 NOTE — DISCHARGE INSTR - COC
Continuity of Care Form    Patient Name: Sarahy Preston   :  1955  MRN:  [id number]    Admit date:  2022  Discharge date:  ***    Code Status Order: Full Code   Advance Directives:     Admitting Physician:  Vero Loya MD  PCP: Dana Reyes MD    Discharging Nurse: [facility] Unit/Room#: 0116/0116-01  Discharging Unit Phone Number: ***    Emergency Contact:   Extended Emergency Contact Information  Primary Emergency Contact: [address] [place] [address] of [address] [place] [address] Phone: [phone]  Mobile Phone: [phone]  Relation: Child  Secondary Emergency Contact: Aric Montoya of [address] [place] [address] Phone: [phone]  Mobile Phone: [phone]  Relation: Child    Past Surgical History:  History reviewed. No pertinent surgical history.     Immunization History:   Immunization History   Administered Date(s) Administered    COVID-19, MODERNA BLUE border, Primary or Immunocompromised, (age 12y+), IM, 100 mcg/0.5mL 2021, 04/15/2021    Influenza, FLUAD, (age 72 y+), Adjuvanted, 0.5mL 2021    Pneumococcal Polysaccharide (Gzywhghxn31) 2021    Tdap (Boostrix, Adacel) 2018       Active Problems:  Patient Active Problem List   Diagnosis Code    Former smoker Z87.891    Hyperglycemia R73.9    Hepatitis C antibody test positive R76.8    STEMI (ST elevation myocardial infarction) ([facility] [address] I21.3    Atypical lymphocytes present on peripheral blood smear D72.89    Hyperlipidemia E78.5    Former tobacco use Z87.891    CAD S/P percutaneous coronary angioplasty I25.10, F00.67    Acute systolic heart failure (HCC) I50.21    Ischemic cardiomyopathy I25.5       Isolation/Infection:   Isolation            No Isolation          Patient Infection Status       None to display            Nurse Assessment:  Last Vital Signs: BP (!) 139/91   Pulse 69   Temp 98.6 °F (37 °C) (Oral)   Resp 18   Ht 5' 11\" (1.803 m)   Wt 224 lb (101.6 kg)   SpO2 95%   BMI 31.24 kg/m² Last documented pain score (0-10 scale): Pain Level: 4  Last Weight:   Wt Readings from Last 1 Encounters:   22 224 lb (101.6 kg)     Mental Status:  {IP PT MENTAL STATUS:}    IV Access:  { CHRIS IV ACCESS:668686642}    Nursing Mobility/ADLs:  Walking   {CHP DME OFQK:721728721}  Transfer  {CHP DME UBTC:294613496}  Bathing  {CHP DME DOVR:787388403}  Dressing  {CHP DME BEMN:031926520}  Toileting  {CHP DME WTNH:079070091}  Feeding  {CHP DME DGAS:365067023}  Med Admin  {CHP DME LPCL:219924270}  Med Delivery   { CHRIS MED Delivery:752183326}    Wound Care Documentation and Therapy:        Elimination:  Continence: Bowel: {YES / XS:34370}  Bladder: {YES / IE:17597}  Urinary Catheter: {Urinary Catheter:699881803}   Colostomy/Ileostomy/Ileal Conduit: {YES / ALEX:97460}       Date of Last BM: ***    Intake/Output Summary (Last 24 hours) at 2022 1115  Last data filed at 2022 0800  Gross per 24 hour   Intake --   Output 2125 ml   Net -2125 ml     I/O last 3 completed shifts: In: 269.2 [P.O.:150;  I.V.:119.2]  Out:  [Urine:]    Safety Concerns:     [employer] Safety Concerns:773220907}    Impairments/Disabilities:      [employer] Impairments/Disabilities:769751237}    Nutrition Therapy:  Current Nutrition Therapy:   [employer] Diet List:083854326}    Routes of Feeding: {CHP DME Other Feedings:013200574}  Liquids: {Slp liquid thickness:45212}  Daily Fluid Restriction: {CHP DME Yes amt example:086216397}  Last Modified Barium Swallow with Video (Video Swallowing Test): {Done Not Done LPKF:360025373}    Treatments at the Time of Hospital Discharge:   Respiratory Treatments: ***  Oxygen Therapy:  {Therapy; copd oxygen:19507}  Ventilator:    { CC Vent VMAQ:871226746}    Rehab Therapies: {THERAPEUTIC INTERVENTION:5255172407}  Weight Bearing Status/Restrictions: [address] [employer] BOWDEN Weight Bearin}  Other Medical Equipment (for information only, NOT a DME order):  {EQUIPMENT:884976913}  Other Treatments: ***    Patient's personal belongings (please select all that are sent with patient):  {CHP DME Belongings:732722480}    RN SIGNATURE:  {Esignature:664124313}    CASE MANAGEMENT/SOCIAL WORK SECTION    Inpatient Status Date: ***    Readmission Risk Assessment Score:  Readmission Risk              Risk of Unplanned Readmission:  10           Discharging to Facility/ Agency   Name:   Address:  Phone:  Fax:    Dialysis Facility (if applicable)   Name:  Address:  Dialysis Schedule:  Phone:  Fax:    / signature: {Esignature:899442618}    PHYSICIAN SECTION    Prognosis: {Prognosis:5160903185}    Condition at Discharge: [address] [place] [address] Patient Condition:011892899}    Rehab Potential (if transferring to Rehab): {Prognosis:7775866638}    Recommended Labs or Other Treatments After Discharge: ***    Physician Certification: I certify the above information and transfer of Preet Ellis  is necessary for the continuing treatment of the diagnosis listed and that he requires {Admit to Appropriate Level of Care:71905} for {GREATER/LESS:907234315} 30 days.      Update Admission H&P: {CHP DME Changes in BLJW}    PHYSICIAN SIGNATURE:  {Esignature:532058984}

## 2022-12-09 NOTE — PROGRESS NOTES
Shift: 8424-9934    Admitting diagnosis: STEMI    Presentation to hospital: C/O chest pain while watching tv at home    Surgery: yes - PCI     Nursing assessment at handoff  stable    Emergency Contact/POA:Daughter Marci Deshpande  Family updated: yes - at bedside after cath    Most recent vitals: BP 84/60   Pulse 66   Temp 98 °F (36.7 °C) (Axillary)   Resp 22   Ht 5' 11\" (1.803 m)   Wt 226 lb 13.7 oz (102.9 kg)   SpO2 96%   BMI 31.64 kg/m²      Rhythm: Normal Sinus Rhythm 60-70's     NC/HFNC- 0 lpm  Respiratory support: - No ventilator support    Vent days: Day 0    Increased O2 requirements: yes     Admission weight Weight: 218 lb (98.9 kg)  Today's weight   Wt Readings from Last 1 Encounters:   12/07/22 226 lb 13.7 oz (102.9 kg)         UOP >30ml/hr: yes      Tomlinson need assessed each shift: no    Restraints: no  Order current and documentation up to date? Lines/Drains  LDA Insertion Date Discontinued Date Dressing Changes   PIV       TLC       Arterial       Tomlinson       Vas Cath      ETT       Surgical drains        Night Shift Hospitalist Interventions    Problem(Brief) Date Time Intervention Physician contacted                                               Drip rates at handoff:    sodium chloride         Hospital Course Daily Updates:    Admit Day# 0 Nights  -admit to ICU @2230 from cath lab.  R. Radial approach, one stent to LAD  -TR band off @0100, transparent dressing in place  -Integrilin off 12/8 @1030  -troponin 0.69    Admit day #1  -vs stable, no drips, denies pain, possible discharge      Lab Data:   CBC:   Recent Labs     12/07/22 2059   WBC 15.7*   HGB 16.2   HCT 48.5   MCV 88.1          BMP:    Recent Labs     12/08/22  0425 12/09/22  0436    136   K 4.6 3.7   CO2 27 25   BUN 11 13   CREATININE 0.9 1.2       LIVR:   Recent Labs     12/07/22 2042   AST 24   ALT 22       PT/INR:   Recent Labs     12/07/22 2042   PROT 8.3*       APTT:   Recent Labs     12/07/22 2042   APTT 25.7 ABG: No results for input(s): PHART, SXK7SLH, PO2ART in the last 72 hours.   Consults (if GI or Nephrology- which group?)-  cardiology

## 2022-12-10 NOTE — DISCHARGE SUMMARY
Hospital Medicine Discharge Summary    Patient ID: Luke Nelson      Patient's PCP: Salima Ornelas MD    Admit Date: 12/7/2022     Discharge Date: 12/9/2022      Admitting Provider: Rubens Suh MD     Discharge Provider: Brenden Lopez MD     Discharge Diagnoses: Active Hospital Problems    Diagnosis     Hyperlipidemia [E78.5]      Priority: Medium    Former tobacco use [Z87.891]      Priority: Medium    CAD S/P percutaneous coronary angioplasty [I25.10, Z98.61]      Priority: Medium    Acute systolic heart failure (HCC) [I50.21]      Priority: Medium    Ischemic cardiomyopathy [I25.5]      Priority: Medium    STEMI (ST elevation myocardial infarction) ([facility] [address] [I21.3]      Priority: Medium    Atypical lymphocytes present on peripheral blood smear [D72.89]      Priority: Medium       The patient was seen and examined on day of discharge and this discharge summary is in conjunction with any daily progress note from day of discharge. Hospital Course:   Luke Nelson is a 78 y.o. male. He presented to the ER from home with chest pain. He was diagnosed with an anterior STEMI in the ER. He was taken for urgent coronary angiography. STEMI  - cardiology consulted  - s/p LHC with PCI to LAD  - echo with EF 35-40%  - started on ASA, brilinta, statin, coreg    Acute systolic heart failure  - cardiology consulted  - echo with EF 35-40%  - started on lasix, metoprolol, valsartan    Lymphocytosis  - flow cytometry unremarkable  - repeat blood work in 1-2 weeks    HTN  - poorly controlled  - started on valsartan, metoprolol    HLD  - increased statin    Obesity  With Body mass index is 31.24 kg/m². Complicating assessment and treatment. Placing patient at risk for multiple co-morbidities as well as early death and contributing to the patient's presentation. Counseled on weight loss.      Physical Exam Performed:     BP (!) 139/91   Pulse 69   Temp 98.6 °F (37 °C) (Oral)   Resp 18   Ht 5' 11\" (1.803 m)   Wt 224 lb (101.6 kg)   SpO2 95%   BMI 31.24 kg/m²       General appearance:  No apparent distress, appears stated age and cooperative. HEENT:  Normal cephalic, atraumatic without obvious deformity. Pupils equal, round, and reactive to light. Extra ocular muscles intact. Conjunctivae/corneas clear. Neck: Supple, with full range of motion. No jugular venous distention. Trachea midline. Respiratory:  Normal respiratory effort. Clear to auscultation, bilaterally without Rales/Wheezes/Rhonchi. Cardiovascular:  Regular rate and rhythm with normal S1/S2 without murmurs, rubs or gallops. Abdomen: Soft, non-tender, non-distended with normal bowel sounds. Musculoskeletal:  No clubbing, cyanosis or edema bilaterally. Full range of motion without deformity. Skin: Skin color, texture, turgor normal.  No rashes or lesions. Neurologic:  Neurovascularly intact without any focal sensory/motor deficits. Cranial nerves: II-XII intact, grossly non-focal.  Psychiatric:  Alert and oriented, thought content appropriate, normal insight  Capillary Refill: Brisk,< 3 seconds   Peripheral Pulses: +2 palpable, equal bilaterally       Labs: For convenience and continuity at follow-up the following most recent labs are provided:      CBC:    Lab Results   Component Value Date/Time    WBC 15.7 12/07/2022 08:59 PM    HGB 16.2 12/07/2022 08:59 PM    HCT 48.5 12/07/2022 08:59 PM     12/07/2022 08:59 PM       Renal:    Lab Results   Component Value Date/Time     12/09/2022 04:36 AM    K 3.7 12/09/2022 04:36 AM     12/09/2022 04:36 AM    CO2 25 12/09/2022 04:36 AM    BUN 13 12/09/2022 04:36 AM    CREATININE 1.2 12/09/2022 04:36 AM    CALCIUM 8.5 12/09/2022 04:36 AM         Significant Diagnostic Studies    Radiology:   XR CHEST PORTABLE   Final Result   Diffuse interstitial infiltrates seen throughout the lungs which may   represent pulmonary edema or interstitial pneumonia.                 Consults:     IP CONSULT TO CARDIAC REHAB  IP CONSULT TO CARDIAC REHAB    Disposition:  home     Condition at Discharge: Stable    Discharge Instructions/Follow-up:  Follow up with PCP, cardiology within 1-2 weeks    Code Status:  full code    Activity: activity as tolerated    Diet: regular diet      Discharge Medications:     Discharge Medication List as of 12/9/2022 11:15 AM             Details   aspirin 81 MG chewable tablet Take 1 tablet by mouth daily, Disp-30 tablet, R-0Normal      valsartan (DIOVAN) 40 MG tablet Take 0.5 tablets by mouth daily, Disp-15 tablet, R-0Normal      carvedilol (COREG) 3.125 MG tablet Take 1 tablet by mouth 2 times daily (with meals), Disp-60 tablet, R-0Normal      furosemide (LASIX) 20 MG tablet Take 1 tablet by mouth daily, Disp-30 tablet, R-0Normal      ticagrelor (BRILINTA) 90 MG TABS tablet Take 1 tablet by mouth 2 times daily, Disp-60 tablet, R-0Normal                Details   atorvastatin (LIPITOR) 40 MG tablet Take 1 tablet by mouth nightly, Disp-30 tablet, R-0Normal             Time Spent on discharge: 38 min in the examination, evaluation, counseling and review of medications and discharge plan. Signed:    Kalani Simms MD   12/9/2022      Thank you Early MD Sam for the opportunity to be involved in this patient's care. If you have any questions or concerns, please feel free to contact me at [phone].

## 2022-12-12 ENCOUNTER — CARE COORDINATION (OUTPATIENT)
Dept: CASE MANAGEMENT | Age: 67
End: 2022-12-12

## 2022-12-12 DIAGNOSIS — I50.21 ACUTE SYSTOLIC HEART FAILURE (HCC): Primary | ICD-10-CM

## 2022-12-12 PROCEDURE — 1111F DSCHRG MED/CURRENT MED MERGE: CPT | Performed by: FAMILY MEDICINE

## 2022-12-12 NOTE — CARE COORDINATION
[facility] Care Transitions Initial Follow Up Call    Call within 2 business days of discharge: Yes    Care Transition Nurse contacted the patient by telephone to perform post hospital discharge assessment. Verified name and  with patient as identifiers. Provided introduction to self, and explanation of the Care Transition Nurse role. Patient: Oren Fournier Patient : 1955   MRN: [id number]  Reason for Admission: STEMI s/p cath  Discharge Date: 22 RARS: Readmission Risk Score: 5.1      Last Discharge  Street       Date Complaint Diagnosis Description Type Department Provider    22 Chest Pain ST elevation myocardial infarction (STEMI), unspecified artery ([facility] [address] . .. ED to Hosp-Admission (Discharged) (ADMIT) Alex Meadows MD; Eugenia Ellis. .. Was this an external facility discharge? No Discharge Facility:     Challenges to be reviewed by the provider   Additional needs identified to be addressed with provider: Yes  Please assist patient with post hospitalization scheduling if needed. Patient has appt with cardiologist , but no PCP follow up noted in system. Method of communication with provider: chart routing. Patient answered call and verified . Patient pleasant and agreeable to transition call. Patient is feeling better since d/c from hospital. Discussed recent hospitalization and confirmed that he had AVS. CTN reviewed all medications and noted in system. Patient is wanting to return to work, but unsure of when to return to work. CTN encouraged to speak to provider d/t post procedure restrictions. Stated that his wrist has healed up and denied any pain. Denies any acute needs at present time. Agreeable to f/u calls. Educated on the use of urgent care or physicians 24 hr access line if assistance is needed after hours. Care Transition Nurse reviewed discharge instructions with patient who verbalized understanding.  The patient was given an opportunity to ask questions and does not have any further questions or concerns at this time. Were discharge instructions available to patient? Yes. Reviewed appropriate site of care based on symptoms and resources available to patient including: PCP  Specialist. The patient agrees to contact the PCP office for questions related to their healthcare. Advance Care Planning:   Does patient have an Advance Directive: not on file. Health Care Decision Maker :   Primary Decision Maker: Yong Royal - Child - [phone]    Secondary Decision Maker: Maryam Barrios - Child - [phone]        Medication reconciliation was performed with patient, who verbalizes understanding of administration of home medications. Medications reviewed, 1111F entered: yes    Was patient discharged with a pulse oximeter? no    Non-face-to-face services provided:  Obtained and reviewed discharge summary and/or continuity of care documents    Offered patient enrollment in the Remote Patient Monitoring (RPM) program for in-home monitoring: Patient declined. Care Transitions 24 Hour Call    Do you have a copy of your discharge instructions?: Yes  Do you have all of your prescriptions and are they filled?: Yes  Have you been contacted by a [employer] [address]?: No  Have you scheduled your follow up appointment?: Yes  How are you going to get to your appointment?: Car - drive self  Do you feel like you have everything you need to keep you well at home?: Yes  Are you an active caregiver in your home?: No  Care Transitions Interventions         Follow Up  Future Appointments   Date Time Provider Kasi Rodas   12/20/2022 10:30 AM DO Esther Suarez [facility]       Care Transition Nurse provided contact information. Plan for follow up call in 7-10 days  based on severity of symptoms and risk factors.   Plan for next call: follow-up appointment-appt with DR haseeb Stern, JJ

## 2022-12-16 LAB
EKG ATRIAL RATE: 92 BPM
EKG DIAGNOSIS: NORMAL
EKG P AXIS: 31 DEGREES
EKG P-R INTERVAL: 168 MS
EKG Q-T INTERVAL: 360 MS
EKG QRS DURATION: 88 MS
EKG QTC CALCULATION (BAZETT): 445 MS
EKG R AXIS: -12 DEGREES
EKG T AXIS: 32 DEGREES
EKG VENTRICULAR RATE: 92 BPM

## 2022-12-19 NOTE — PROGRESS NOTES
[facility]   Cardiovascular Evaluation    PATIENT: Tammy Cullen  DATE: 2022  MRN: [id number]  CSN: [id number]  : 1955      Primary Care Doctor: Isaias Sánchez MD  Reason for evaluation:   Follow-up for CAD, ischemic cardiomyopathy      Subjective:     Tammy Cullen is a 78 y.o. male with a history of CAD s/p acute anterior STEMI and PCI of mid-LAD Ulysses [place] 2.75 x 38 mm MANOLO (tapered to ~3.6 mm) on , LV systolic heart failure secondary to ischemic cardiomyopathy, acute LV systolic heart failure/ischemic cardiomyopathy, hyperlipidemia and former tobacco use who presents for follow-up. The patient was admitted to [facility] on 22 with an acute anterior STEMI and underwent emergent invasive coronary angiography demonstrating severe single-vessel coronary artery disease with a mid-LAD culprit lesion. At that time, he underwent PCI of his mid-LAD with an Ulysses [place] 2.75 x 38 mm MANOLO, tapered to ~3.6 mm. There was a focal 70% residual stenosis of the small caliber apical LAD for which medical therapy was recommended. TTE obtained during his admission showed an LVEF of 35-40% with hypokinesis of the mid-anterior, anteroseptal, inferoseptal, apical anterior, apical septal, and apical lateral walls, grade 2 diastolic dysfunction, normal RV size and systolic function, mild mitral regurgitation, mild tricuspid regurgitation, and mild pulmonary hypertension with SPAP estimate 40 mmHg. Since his discharge, the patient reports that he has been feeling well. He denies any recurrent angina or shortness of breath. He further denies any palpitations or lower extremity edema. He has been compliant with his dual antiplatelet therapy and other cardiac medications. He works in [employer] and would like to return to work. Past Medical History:   has a past medical history of CAD S/P percutaneous coronary angioplasty and Hyperlipidemia.     Surgical History:   has no past surgical history on file. Social History:   reports that he quit smoking about 16 years ago. His smoking use included cigarettes. He has a 25.00 pack-year smoking history. He has never used smokeless tobacco. He reports that he does not drink alcohol and does not use drugs. Family History:  Family History   Problem Relation Age of Onset    Diabetes Brother     Dementia Mother     Diabetes Maternal Grandfather     Diabetes Brother          Home Medications:  Reviewed and are listed in nursing record. and/or listed below  Current Outpatient Medications   Medication Sig Dispense Refill    aspirin 81 MG chewable tablet Take 1 tablet by mouth daily 30 tablet 0    atorvastatin (LIPITOR) 40 MG tablet Take 1 tablet by mouth nightly 30 tablet 0    valsartan (DIOVAN) 40 MG tablet Take 0.5 tablets by mouth daily 15 tablet 0    carvedilol (COREG) 3.125 MG tablet Take 1 tablet by mouth 2 times daily (with meals) 60 tablet 0    furosemide (LASIX) 20 MG tablet Take 1 tablet by mouth daily 30 tablet 0    ticagrelor (BRILINTA) 90 MG TABS tablet Take 1 tablet by mouth 2 times daily 60 tablet 0     No current facility-administered medications for this visit. Allergies:  Patient has no known allergies. Review of Systems:   A 14 point review of symptoms completed. Pertinent positives identified in the HPI, all other review of symptoms negative as below. Objective:   PHYSICAL EXAM:    Vitals:    12/20/22 1029   BP: 114/76   Pulse: 99   SpO2: 100%    Weight: 223 lb (101.2 kg)     Wt Readings from Last 3 Encounters:   12/20/22 223 lb (101.2 kg)   12/09/22 224 lb (101.6 kg)   12/09/21 221 lb 6.4 oz (100.4 kg)     General: Adult male lying in bed in no acute distress. HEENT: Normocephalic, atraumatic, non-icteric, hearing intact, nares normal, mucous membranes moist.  Neck: No JVD. Heart: Regular rate and rhythm. Normal S1 and S2. No murmurs, gallops or rubs. Lungs: Normal respiratory effort. No wheezes, rales, or rhonchi. Abdomen: Soft, non-tender. Normoactive bowel sounds. No masses or organomegaly. Skin: No rashes. Pulses: 2+ and symmetric. Extremities: Right radial artery access site is soft, non-tender, and without evidence of hematoma. No clubbing, cyanosis, or edema. Psych: Normal mood and affect. Neuro: Alert and oriented to person, place, and time. No focal deficits noted.       LABS   CBC:      Lab Results   Component Value Date/Time    WBC 15.7 12/07/2022 08:59 PM    RBC 5.51 12/07/2022 08:59 PM    HGB 16.2 12/07/2022 08:59 PM    HCT 48.5 12/07/2022 08:59 PM    MCV 88.1 12/07/2022 08:59 PM    RDW 13.4 12/07/2022 08:59 PM     12/07/2022 08:59 PM     CMP:  Lab Results   Component Value Date/Time     12/09/2022 04:36 AM    K 3.7 12/09/2022 04:36 AM     12/09/2022 04:36 AM    CO2 25 12/09/2022 04:36 AM    BUN 13 12/09/2022 04:36 AM    CREATININE 1.2 12/09/2022 04:36 AM    GFRAA 57 09/28/2021 06:51 AM    AGRATIO 0.9 12/07/2022 08:42 PM    LABGLOM >60 12/09/2022 04:36 AM    GLUCOSE 113 12/09/2022 04:36 AM    PROT 8.3 12/07/2022 08:42 PM    CALCIUM 8.5 12/09/2022 04:36 AM    BILITOT 0.4 12/07/2022 08:42 PM    ALKPHOS 112 12/07/2022 08:42 PM    AST 24 12/07/2022 08:42 PM    ALT 22 12/07/2022 08:42 PM     PT/INR:   No results found for: PTINR  Liver:  No components found for: CHLPL  Lab Results   Component Value Date    ALT 22 12/07/2022    AST 24 12/07/2022    ALKPHOS 112 12/07/2022    BILITOT 0.4 12/07/2022     Lab Results   Component Value Date    LABA1C 6.1 12/08/2022     Lipids:         Lab Results   Component Value Date    TRIG 140 12/08/2022    TRIG 213 (H) 12/01/2021    TRIG 163 (H) 02/12/2018            Lab Results   Component Value Date    HDL 28 (L) 12/08/2022    HDL 34 (L) 12/01/2021    HDL 34 (L) 02/12/2018            Lab Results   Component Value Date    LDLCALC 83 12/08/2022    LDLCALC 135 (H) 12/01/2021    LDLCALC 123 (H) 02/12/2018            Lab Results Component Value Date    LABVLDL 28 12/08/2022    LABVLDL 43 12/01/2021    LABVLDL 33 02/12/2018         CARDIAC DATA      Echo:   TTE 12/8/22:  Conclusions   Summary   The left ventricular systolic function is moderately reduced with an   estimated ejection fraction of 35-40%. There is hypokinesis of the mid-anterior, anteroseptal, inferoseptal, apical   anterior, apical septal, and apical lateral walls. Normal left ventricle size and wall thickness. Grade II diastolic dysfunction with elevated LV filling pressures. Normal RV size and systolic function. Mild mitral regurgitation. Mild tricuspid regurgitation. Systolic pulmonic artery pressure (SPAP) is estimated at 40 mmHg, consistent   with mild pulmonary hypertension (Right atrial pressure of 3 mmHg). Stress Test: N/A     Cath:  [facility] 12/7/22: Findings:  Hemodynamics:              A. Opening arterial pressure: 107/63 (84) mmHg              B. LVEDP: 22 mmHg     2. Coronary anatomy:  A. Left main artery: The left main artery bifurcates into the left anterior descending artery and left circumflex artery. The left main artery has minor luminal irregularities. B. Left anterior descending artery: Transapical vessel which gives rise to a large, high origin early bifurcating diagonal artery. The proximal LAD has a 30% stenosis. The mid-LAD has sequential 40% and 95% stenoses. The distal LAD has a 40% stenosis and there is another focal 70% stenosis in the small caliber apical LAD. The diagonal artery is a large, early bifurcating vessel with a high origin. The more superior branch of the diagonal artery has a 30% stenosis. C. Left circumflex artery: Non-dominant vessel that gives rise to 2 obtuse marginal arteries and a large left posterolateral branch. The LCx has 20% ostial and 20% proximal stenoses. The OM1 is a very small vessel with a high origin and has mild disease.   The OM2 is a medium caliber branching vessel with minor luminal irregularities. The left posterolateral branch is a large vessel with a 30% ostial stenosis. D. Right coronary artery: Co-dominant vessel. The RCA has a 30% proximal/mid-vessel stenosis. The remainder of the vessel has minor luminal irregularities. 3. Left ventriculography;  A. LVEF 40% with hypokinesis of the anterior and apical walls. B. 1+ mitral regurgitation. Results of Intervention (PCI of mid-LAD):  Pre - 95% stenosis, FABIAN 2 flow  Post - 10% stenosis, FABIAN 3 flow       Assessment:     1. CAD - S/p acute anterior STEMI and PCI of mid-LAD with Ulysses [place] 2.75 x 38 mm MANOLO, tapered to ~3.6 mm on 12/7/22. There was a focal 70% residual stenosis of the small caliber apical LAD for which medical therapy was recommended. Overall, patient appears to be recovering well from his recent MI and denies recurrent angina. 2. LV systolic heart failure/ischemic cardiomyopathy - LVEF 35-40% on TTE.  currently appears to be near a clinically euvolemic state. 3. Hyperlipidemia  4. Former tobacco use    Plan:     1. Continue aspirin 81 mg daily, Brilinta 90 mg BID, and atorvastatin 40 mg qHS. Brilinta should be continued through at least 12/7/23.  2. Increase coreg to 6.25 mg BID. 3. Transition from valsartan to Entresto 24-26 mg BID. 4. Continue lasix 20 mg daily. 5. Provided with prescription for SL nitroglycerin and instructed on proper use. 6. Refer for cardiac rehab.  7. OK to return to work on light duty. 8. Repeat BMP and magnesium level. 9. Recommend heart healthy, low sodium diet. 10. Follow-up with me in 3 months. Scribe's attestation: This note was scribed in the presence of Choco Spears DO by Erik Wilkinson RN      It is a pleasure to assist in the care of Karin Delgadillo. Vishnu. Please call with any questions. The scribes documentation has been prepared under my direction and personally reviewed by me in its entirety.   I confirm that the note above accurately reflects all work, treatment, procedures, and medical decision making performed by me. I, Dr. Fabio Hogue, personally performed the services described in this documentation as scribed by Ondina Chaudhry RN in my presence, and it is both accurate and complete to the best of our ability.        Fabio Hogue, [address] [facility]  [phone] [facility]  [phone] [address] [place] [address]

## 2022-12-20 ENCOUNTER — CARE COORDINATION (OUTPATIENT)
Dept: CASE MANAGEMENT | Age: 67
End: 2022-12-20

## 2022-12-20 ENCOUNTER — OFFICE VISIT (OUTPATIENT)
Dept: CARDIOLOGY CLINIC | Age: 67
End: 2022-12-20
Payer: MEDICARE

## 2022-12-20 VITALS
BODY MASS INDEX: 31.22 KG/M2 | WEIGHT: 223 LBS | SYSTOLIC BLOOD PRESSURE: 114 MMHG | OXYGEN SATURATION: 100 % | HEART RATE: 99 BPM | HEIGHT: 71 IN | DIASTOLIC BLOOD PRESSURE: 76 MMHG

## 2022-12-20 DIAGNOSIS — Z87.891 FORMER TOBACCO USE: ICD-10-CM

## 2022-12-20 DIAGNOSIS — I50.21 ACUTE SYSTOLIC HEART FAILURE (HCC): ICD-10-CM

## 2022-12-20 DIAGNOSIS — Z09 HOSPITAL DISCHARGE FOLLOW-UP: ICD-10-CM

## 2022-12-20 DIAGNOSIS — I25.10 CAD S/P PERCUTANEOUS CORONARY ANGIOPLASTY: Primary | ICD-10-CM

## 2022-12-20 DIAGNOSIS — E78.5 HYPERLIPIDEMIA, UNSPECIFIED HYPERLIPIDEMIA TYPE: ICD-10-CM

## 2022-12-20 DIAGNOSIS — I25.5 ISCHEMIC CARDIOMYOPATHY: ICD-10-CM

## 2022-12-20 DIAGNOSIS — Z98.61 CAD S/P PERCUTANEOUS CORONARY ANGIOPLASTY: Primary | ICD-10-CM

## 2022-12-20 PROBLEM — I25.119 ATHEROSCLEROTIC HEART DISEASE OF NATIVE CORONARY ARTERY WITH UNSPECIFIED ANGINA PECTORIS (HCC): Status: ACTIVE | Noted: 2022-12-20

## 2022-12-20 PROBLEM — I20.9 ANGINA PECTORIS, UNSPECIFIED (HCC): Status: ACTIVE | Noted: 2022-12-20

## 2022-12-20 PROCEDURE — 99214 OFFICE O/P EST MOD 30 MIN: CPT | Performed by: INTERNAL MEDICINE

## 2022-12-20 PROCEDURE — G8484 FLU IMMUNIZE NO ADMIN: HCPCS | Performed by: INTERNAL MEDICINE

## 2022-12-20 PROCEDURE — 1123F ACP DISCUSS/DSCN MKR DOCD: CPT | Performed by: INTERNAL MEDICINE

## 2022-12-20 PROCEDURE — 1036F TOBACCO NON-USER: CPT | Performed by: INTERNAL MEDICINE

## 2022-12-20 PROCEDURE — G8427 DOCREV CUR MEDS BY ELIG CLIN: HCPCS | Performed by: INTERNAL MEDICINE

## 2022-12-20 PROCEDURE — 1111F DSCHRG MED/CURRENT MED MERGE: CPT | Performed by: INTERNAL MEDICINE

## 2022-12-20 PROCEDURE — G8417 CALC BMI ABV UP PARAM F/U: HCPCS | Performed by: INTERNAL MEDICINE

## 2022-12-20 PROCEDURE — 3017F COLORECTAL CA SCREEN DOC REV: CPT | Performed by: INTERNAL MEDICINE

## 2022-12-20 RX ORDER — FUROSEMIDE 20 MG/1
20 TABLET ORAL DAILY
Qty: 30 TABLET | Refills: 11 | Status: SHIPPED | OUTPATIENT
Start: 2022-12-20

## 2022-12-20 RX ORDER — VALSARTAN 40 MG/1
20 TABLET ORAL DAILY
Qty: 15 TABLET | Refills: 11 | Status: CANCELLED | OUTPATIENT
Start: 2022-12-20

## 2022-12-20 RX ORDER — CARVEDILOL 6.25 MG/1
6.25 TABLET ORAL 2 TIMES DAILY WITH MEALS
Qty: 60 TABLET | Refills: 11 | Status: SHIPPED | OUTPATIENT
Start: 2022-12-20

## 2022-12-20 RX ORDER — ATORVASTATIN CALCIUM 40 MG/1
40 TABLET, FILM COATED ORAL NIGHTLY
Qty: 30 TABLET | Refills: 11 | Status: SHIPPED | OUTPATIENT
Start: 2022-12-20

## 2022-12-20 RX ORDER — ASPIRIN 81 MG/1
81 TABLET, CHEWABLE ORAL DAILY
Qty: 30 TABLET | Refills: 11 | Status: SHIPPED | OUTPATIENT
Start: 2022-12-20

## 2022-12-20 RX ORDER — CARVEDILOL 3.12 MG/1
3.12 TABLET ORAL 2 TIMES DAILY WITH MEALS
Qty: 60 TABLET | Refills: 11 | Status: CANCELLED | OUTPATIENT
Start: 2022-12-20

## 2022-12-20 RX ORDER — NITROGLYCERIN 0.4 MG/1
0.4 TABLET SUBLINGUAL EVERY 5 MIN PRN
Qty: 25 TABLET | Refills: 3 | Status: SHIPPED | OUTPATIENT
Start: 2022-12-20

## 2022-12-20 NOTE — CARE COORDINATION
[facility] Care Transitions Follow Up Call    Care Transition Nurse contacted the patient by telephone. Patient: Oren Fournier  Patient : 1955   MRN: [id number]  Reason for Admission: STEMI s/p cardiac cath   Discharge Date: 22 RARS: Readmission Risk Score: 5.1          Attempted to reach patient via phone for care transition. VM left stating purpose of call along with my contact information requesting a return call.                  Care Transitions Subsequent and Final Call    Subsequent and Final Calls  Care Transitions Interventions  Other Interventions:           Cherie KRUGERN, RN, [facility]  Care Transition Nurse  [phone] mobile

## 2022-12-20 NOTE — PATIENT INSTRUCTIONS
Patient Plan:  1. Recommend cardiac rehab    -you will attended 1 hour sessions three times a week for a total of three times  2. INCREASE Carvedilol 6.25mg twice a day  3. STOP Valsartan  4. START Entresto 24-26mg twice a day  5. Continue taking other medications as prescribed   -Aspirin 81mg daily, Atorvastatin 40mg daily, Furosemide 20mg daily, Brilinta 90mg twice daily  6. OK to return to work on [employer] duty\"-recommend not lifting anything greater than 20 pounds  7.  Follow-up with me in 3 months or sooner if needed

## 2022-12-27 ENCOUNTER — CARE COORDINATION (OUTPATIENT)
Dept: CASE MANAGEMENT | Age: 67
End: 2022-12-27

## 2022-12-27 NOTE — CARE COORDINATION
[facility] Care Transitions Follow Up Call    Care Transition Nurse contacted the patient by telephone to follow up after admission. Verified name and  with patient as identifiers. Patient: Pattie Cabrera  Patient : 1955   MRN: [id number]  Reason for Admission: STEMI s/p cath  Discharge Date: 22 RARS: Readmission Risk Score: 5.1      Needs to be reviewed by the provider   Additional needs identified to be addressed with provider: No  none             Method of communication with provider: none. Patient answered call and verified . Patient pleasant and agreeable to transition call. Patient feeling well and happy to return to work. Patient was seen by cardiologist and got the okay to return to work. Patient has returned with restrictions. Patient is schedule 22 with non [facility] PCP and cardiologist later next month. Denied any pain or shortness of breath. Denied any acute needs at present time. denied any further transition support. Educated on the use of urgent care or physicians 24 hr access line if assistance is needed after hours. Addressed changes since last contact:  none  Discussed follow-up appointments. If no appointment was previously scheduled, appointment scheduling offered: Yes. Is follow up appointment scheduled within 7 days of discharge? No.    Follow Up  No future appointments. Non-[facility] follow up appointment(s): 23    Care Transition Nurse reviewed medical action plan with patient and discussed any barriers to care and/or understanding of plan of care after discharge. Discussed appropriate site of care based on symptoms and resources available to patient including: PCP  Specialist. The family agrees to contact the PCP office for questions related to their healthcare. Advance Care Planning:   not on file.      Patients top risk factors for readmission: functional physical ability  Interventions to address risk factors: Education of patient/family/caregiver/guardian to support self-management-        Care Transitions Subsequent and Final Call    Subsequent and Final Calls  Do you have any ongoing symptoms?: No  Have your medications changed?: No  Do you have any questions related to your medications?: No  Do you currently have any active services?: No  Do you have any needs or concerns that I can assist you with?: No  Identified Barriers: None  Care Transitions Interventions  Other Interventions:             Care Transition Nurse provided contact information for future needs. No further follow-up call indicated based on severity of symptoms and risk factors.     Berta Guerin RN

## 2023-01-03 ENCOUNTER — TELEPHONE (OUTPATIENT)
Dept: CARDIOLOGY CLINIC | Age: 68
End: 2023-01-03

## 2023-01-03 NOTE — TELEPHONE ENCOUNTER
12/20/22, [facility] sent in 30 day script with 11 refills for all medications. Called and let pt know, he v/u and will get in contact with pharmacy.

## 2023-01-03 NOTE — TELEPHONE ENCOUNTER
Pt called requesting refills on the following medications:  aspirin 81 MG chewable tablet     atorvastatin (LIPITOR) 40 MG tablet     furosemide (LASIX) 20 MG tablet     Please send to:  [facility] [id number] [facility] [phone] [facility] [phone] [address], [place] [address]   Phone:  [phone]  Fax:  [phone]

## 2023-01-08 NOTE — PROGRESS NOTES
Shift: [id number]    Admitting diagnosis: STEMI    Presentation to hospital: C/O chest pain while watching tv at home    Surgery: yes - PCI     Nursing assessment at handoff  stable    Emergency Contact/POA:Daughter Franklin Villafana  Family updated: yes - at bedside after cath    Most recent vitals: /76   Pulse 63   Temp 99.1 °F (37.3 °C) (Oral)   Resp 17   Ht 5' 11\" (1.803 m)   Wt 226 lb 13.7 oz (102.9 kg)   SpO2 94%   BMI 31.64 kg/m²      Rhythm: Normal Sinus Rhythm 60-70's     NC/HFNC- 0 lpm  Respiratory support: - No ventilator support    Vent days: Day 0    Increased O2 requirements: yes     Admission weight Weight: 218 lb (98.9 kg)  Today's weight   Wt Readings from Last 1 Encounters:   12/07/22 226 lb 13.7 oz (102.9 kg)         UOP >30ml/hr: yes      Tomlinson need assessed each shift: no    Restraints: no  Order current and documentation up to date? Lines/Drains  LDA Insertion Date Discontinued Date Dressing Changes   PIV       TLC       Arterial       Tomlinson       Vas Cath      ETT       Surgical drains        Night Shift Hospitalist Interventions    Problem(Brief) Date Time Intervention Physician contacted                                               Drip rates at handoff:    eptifibatide 2 mcg/kg/min (12/08/22 0956)    sodium chloride         Hospital Course Daily Updates:    Admit Day# 0 Nights  -admit to ICU @2230 from cath lab.  R. Radial approach, one stent to LAD  -TR band off @0100, transparent dressing in place  -Integrilin off 12/8 @1030  -troponin 0.69        Lab Data:   CBC:   Recent Labs     12/07/22 2059   WBC 15.7*   HGB 16.2   HCT 48.5   MCV 88.1        BMP:    Recent Labs     12/07/22 2042 12/08/22  0425    138   K 3.8 4.6   CO2 28 27   BUN 13 11   CREATININE 1.1 0.9     LIVR:   Recent Labs     12/07/22 2042   AST 24   ALT 22     PT/INR:   Recent Labs     12/07/22 2042   PROT 8.3*     APTT:   Recent Labs     12/07/22 2042   APTT 25.7     ABG: No results for input(s): PHART, TWZ8MRJ, PO2ART in the last 72 hours.   Consults (if GI or Nephrology- which group?)-  cardiology DISCHARGE

## 2023-02-09 ENCOUNTER — TELEPHONE (OUTPATIENT)
Dept: CARDIOLOGY CLINIC | Age: 68
End: 2023-02-09

## 2023-02-09 NOTE — TELEPHONE ENCOUNTER
Spoke with patient. Happened 4-5 times this week. Not running. Just only when he blows he notices a little blood mixed in with mucous. He states his house has been dry and just got his humidifier working. He will monitor and if he has an \"actual\" nose bleed he will notify us.

## 2023-02-09 NOTE — TELEPHONE ENCOUNTER
Pt called into office and stated he is concerned because the past few days he has been blowing his nose and a little blood has been coming out. Pt is wanting to know if he needs to change anything medication wise or if this is normal? Please advise.

## 2023-02-13 ENCOUNTER — HOSPITAL ENCOUNTER (OUTPATIENT)
Dept: CARDIAC REHAB | Age: 68
Setting detail: THERAPIES SERIES
Discharge: HOME | End: 2023-02-13
Payer: MEDICARE

## 2023-02-13 PROCEDURE — 93798 PHYS/QHP OP CAR RHAB W/ECG: CPT

## 2023-02-15 ENCOUNTER — HOSPITAL ENCOUNTER (OUTPATIENT)
Dept: CARDIAC REHAB | Age: 68
Setting detail: THERAPIES SERIES
Discharge: HOME | End: 2023-02-15
Payer: MEDICARE

## 2023-02-15 PROCEDURE — 93798 PHYS/QHP OP CAR RHAB W/ECG: CPT

## 2023-02-17 ENCOUNTER — HOSPITAL ENCOUNTER (OUTPATIENT)
Dept: CARDIAC REHAB | Age: 68
Setting detail: THERAPIES SERIES
Discharge: HOME | End: 2023-02-17
Payer: MEDICARE

## 2023-02-17 PROCEDURE — 93798 PHYS/QHP OP CAR RHAB W/ECG: CPT

## 2023-02-20 ENCOUNTER — HOSPITAL ENCOUNTER (OUTPATIENT)
Dept: CARDIAC REHAB | Age: 68
Setting detail: THERAPIES SERIES
Discharge: HOME | End: 2023-02-20
Payer: MEDICARE

## 2023-02-20 PROCEDURE — 93798 PHYS/QHP OP CAR RHAB W/ECG: CPT

## 2023-02-22 ENCOUNTER — HOSPITAL ENCOUNTER (OUTPATIENT)
Dept: CARDIAC REHAB | Age: 68
Setting detail: THERAPIES SERIES
Discharge: HOME | End: 2023-02-22
Payer: MEDICARE

## 2023-02-22 PROCEDURE — 93798 PHYS/QHP OP CAR RHAB W/ECG: CPT

## 2023-02-24 ENCOUNTER — HOSPITAL ENCOUNTER (OUTPATIENT)
Dept: CARDIAC REHAB | Age: 68
Setting detail: THERAPIES SERIES
Discharge: HOME | End: 2023-02-24
Payer: MEDICARE

## 2023-02-24 PROCEDURE — 93798 PHYS/QHP OP CAR RHAB W/ECG: CPT

## 2023-02-27 ENCOUNTER — HOSPITAL ENCOUNTER (OUTPATIENT)
Dept: CARDIAC REHAB | Age: 68
Setting detail: THERAPIES SERIES
Discharge: HOME | End: 2023-02-27
Payer: MEDICARE

## 2023-02-27 PROCEDURE — 93798 PHYS/QHP OP CAR RHAB W/ECG: CPT

## 2023-03-01 ENCOUNTER — HOSPITAL ENCOUNTER (OUTPATIENT)
Dept: CARDIAC REHAB | Age: 68
Setting detail: THERAPIES SERIES
Discharge: HOME | End: 2023-03-01
Payer: MEDICARE

## 2023-03-01 PROCEDURE — 93798 PHYS/QHP OP CAR RHAB W/ECG: CPT

## 2023-03-06 ENCOUNTER — HOSPITAL ENCOUNTER (OUTPATIENT)
Dept: CARDIAC REHAB | Age: 68
Setting detail: THERAPIES SERIES
Discharge: HOME | End: 2023-03-06
Payer: MEDICARE

## 2023-03-06 PROCEDURE — 93798 PHYS/QHP OP CAR RHAB W/ECG: CPT

## 2023-03-08 ENCOUNTER — HOSPITAL ENCOUNTER (OUTPATIENT)
Dept: CARDIAC REHAB | Age: 68
Setting detail: THERAPIES SERIES
Discharge: HOME | End: 2023-03-08
Payer: MEDICARE

## 2023-03-08 PROCEDURE — 93798 PHYS/QHP OP CAR RHAB W/ECG: CPT

## 2023-03-10 ENCOUNTER — HOSPITAL ENCOUNTER (OUTPATIENT)
Dept: CARDIAC REHAB | Age: 68
Setting detail: THERAPIES SERIES
Discharge: HOME | End: 2023-03-10
Payer: MEDICARE

## 2023-03-10 PROCEDURE — 93798 PHYS/QHP OP CAR RHAB W/ECG: CPT

## 2023-03-13 ENCOUNTER — HOSPITAL ENCOUNTER (OUTPATIENT)
Dept: CARDIAC REHAB | Age: 68
Setting detail: THERAPIES SERIES
Discharge: HOME | End: 2023-03-13
Payer: MEDICARE

## 2023-03-13 PROCEDURE — 93798 PHYS/QHP OP CAR RHAB W/ECG: CPT

## 2023-03-15 ENCOUNTER — HOSPITAL ENCOUNTER (OUTPATIENT)
Dept: CARDIAC REHAB | Age: 68
Setting detail: THERAPIES SERIES
Discharge: HOME | End: 2023-03-15
Payer: MEDICARE

## 2023-03-15 PROCEDURE — 93798 PHYS/QHP OP CAR RHAB W/ECG: CPT

## 2023-03-20 ENCOUNTER — HOSPITAL ENCOUNTER (OUTPATIENT)
Dept: CARDIAC REHAB | Age: 68
Setting detail: THERAPIES SERIES
Discharge: HOME | End: 2023-03-20
Payer: MEDICARE

## 2023-03-20 PROCEDURE — 93798 PHYS/QHP OP CAR RHAB W/ECG: CPT

## 2023-03-22 ENCOUNTER — HOSPITAL ENCOUNTER (OUTPATIENT)
Dept: CARDIAC REHAB | Age: 68
Setting detail: THERAPIES SERIES
Discharge: HOME | End: 2023-03-22
Payer: MEDICARE

## 2023-03-22 PROCEDURE — 93798 PHYS/QHP OP CAR RHAB W/ECG: CPT

## 2023-03-24 ENCOUNTER — APPOINTMENT (OUTPATIENT)
Dept: CARDIAC REHAB | Age: 68
End: 2023-03-24
Payer: MEDICARE

## 2023-03-27 ENCOUNTER — APPOINTMENT (OUTPATIENT)
Dept: CARDIAC REHAB | Age: 68
End: 2023-03-27
Payer: MEDICARE

## 2023-03-29 ENCOUNTER — HOSPITAL ENCOUNTER (OUTPATIENT)
Dept: CARDIAC REHAB | Age: 68
Setting detail: THERAPIES SERIES
Discharge: HOME | End: 2023-03-29
Payer: MEDICARE

## 2023-03-29 PROCEDURE — 93798 PHYS/QHP OP CAR RHAB W/ECG: CPT

## 2023-03-31 ENCOUNTER — HOSPITAL ENCOUNTER (OUTPATIENT)
Dept: CARDIAC REHAB | Age: 68
Setting detail: THERAPIES SERIES
Discharge: HOME | End: 2023-03-31
Payer: MEDICARE

## 2023-03-31 PROCEDURE — 93798 PHYS/QHP OP CAR RHAB W/ECG: CPT

## 2023-04-03 ENCOUNTER — HOSPITAL ENCOUNTER (OUTPATIENT)
Dept: CARDIAC REHAB | Age: 68
Setting detail: THERAPIES SERIES
Discharge: HOME | End: 2023-04-03
Payer: MEDICARE

## 2023-04-03 PROCEDURE — 93798 PHYS/QHP OP CAR RHAB W/ECG: CPT

## 2023-04-05 ENCOUNTER — HOSPITAL ENCOUNTER (OUTPATIENT)
Dept: CARDIAC REHAB | Age: 68
Setting detail: THERAPIES SERIES
Discharge: HOME | End: 2023-04-05
Payer: MEDICARE

## 2023-04-05 PROCEDURE — 93798 PHYS/QHP OP CAR RHAB W/ECG: CPT

## 2023-04-07 ENCOUNTER — HOSPITAL ENCOUNTER (OUTPATIENT)
Dept: CARDIAC REHAB | Age: 68
Setting detail: THERAPIES SERIES
Discharge: HOME | End: 2023-04-07
Payer: MEDICARE

## 2023-04-07 PROCEDURE — 93798 PHYS/QHP OP CAR RHAB W/ECG: CPT

## 2023-04-17 ENCOUNTER — HOSPITAL ENCOUNTER (OUTPATIENT)
Dept: CARDIAC REHAB | Age: 68
Setting detail: THERAPIES SERIES
Discharge: HOME | End: 2023-04-17
Payer: MEDICARE

## 2023-04-17 PROCEDURE — 93798 PHYS/QHP OP CAR RHAB W/ECG: CPT

## 2023-04-19 ENCOUNTER — HOSPITAL ENCOUNTER (OUTPATIENT)
Dept: CARDIAC REHAB | Age: 68
Setting detail: THERAPIES SERIES
Discharge: HOME | End: 2023-04-19
Payer: MEDICARE

## 2023-04-19 PROCEDURE — 93798 PHYS/QHP OP CAR RHAB W/ECG: CPT

## 2023-04-21 ENCOUNTER — HOSPITAL ENCOUNTER (OUTPATIENT)
Dept: CARDIAC REHAB | Age: 68
Setting detail: THERAPIES SERIES
Discharge: HOME | End: 2023-04-21
Payer: MEDICARE

## 2023-04-21 PROCEDURE — 93798 PHYS/QHP OP CAR RHAB W/ECG: CPT

## 2023-05-01 ENCOUNTER — HOSPITAL ENCOUNTER (OUTPATIENT)
Dept: CARDIAC REHAB | Age: 68
Setting detail: THERAPIES SERIES
Discharge: HOME | End: 2023-05-01
Payer: MEDICARE

## 2023-05-01 PROCEDURE — 93798 PHYS/QHP OP CAR RHAB W/ECG: CPT

## 2023-05-03 ENCOUNTER — HOSPITAL ENCOUNTER (OUTPATIENT)
Dept: CARDIAC REHAB | Age: 68
Setting detail: THERAPIES SERIES
Discharge: HOME | End: 2023-05-03
Payer: MEDICARE

## 2023-05-03 PROCEDURE — 93798 PHYS/QHP OP CAR RHAB W/ECG: CPT

## 2023-05-05 ENCOUNTER — HOSPITAL ENCOUNTER (OUTPATIENT)
Dept: CARDIAC REHAB | Age: 68
Setting detail: THERAPIES SERIES
Discharge: HOME | End: 2023-05-05
Payer: MEDICARE

## 2023-05-05 PROCEDURE — 93798 PHYS/QHP OP CAR RHAB W/ECG: CPT

## 2023-05-08 ENCOUNTER — HOSPITAL ENCOUNTER (OUTPATIENT)
Dept: CARDIAC REHAB | Age: 68
Setting detail: THERAPIES SERIES
Discharge: HOME | End: 2023-05-08
Payer: MEDICARE

## 2023-05-08 PROCEDURE — 93798 PHYS/QHP OP CAR RHAB W/ECG: CPT

## 2023-05-10 ENCOUNTER — HOSPITAL ENCOUNTER (OUTPATIENT)
Dept: CARDIAC REHAB | Age: 68
Setting detail: THERAPIES SERIES
Discharge: HOME | End: 2023-05-10
Payer: MEDICARE

## 2023-05-10 PROCEDURE — 93798 PHYS/QHP OP CAR RHAB W/ECG: CPT

## 2023-05-12 ENCOUNTER — HOSPITAL ENCOUNTER (OUTPATIENT)
Dept: CARDIAC REHAB | Age: 68
Setting detail: THERAPIES SERIES
Discharge: HOME | End: 2023-05-12
Payer: MEDICARE

## 2023-05-12 PROCEDURE — 93798 PHYS/QHP OP CAR RHAB W/ECG: CPT

## 2023-05-15 ENCOUNTER — HOSPITAL ENCOUNTER (OUTPATIENT)
Dept: CARDIAC REHAB | Age: 68
Setting detail: THERAPIES SERIES
Discharge: HOME | End: 2023-05-15
Payer: MEDICARE

## 2023-05-15 PROCEDURE — 93798 PHYS/QHP OP CAR RHAB W/ECG: CPT

## 2023-05-17 ENCOUNTER — HOSPITAL ENCOUNTER (OUTPATIENT)
Dept: CARDIAC REHAB | Age: 68
Setting detail: THERAPIES SERIES
Discharge: HOME | End: 2023-05-17
Payer: MEDICARE

## 2023-05-17 PROCEDURE — 93798 PHYS/QHP OP CAR RHAB W/ECG: CPT

## 2023-05-19 ENCOUNTER — HOSPITAL ENCOUNTER (OUTPATIENT)
Dept: CARDIAC REHAB | Age: 68
Setting detail: THERAPIES SERIES
Discharge: HOME | End: 2023-05-19
Payer: MEDICARE

## 2023-05-19 PROCEDURE — 93798 PHYS/QHP OP CAR RHAB W/ECG: CPT

## 2023-05-22 ENCOUNTER — HOSPITAL ENCOUNTER (OUTPATIENT)
Dept: CARDIAC REHAB | Age: 68
Setting detail: THERAPIES SERIES
Discharge: HOME | End: 2023-05-22
Payer: MEDICARE

## 2023-05-22 PROCEDURE — 93798 PHYS/QHP OP CAR RHAB W/ECG: CPT

## 2023-05-24 ENCOUNTER — APPOINTMENT (OUTPATIENT)
Dept: CARDIAC REHAB | Age: 68
End: 2023-05-24
Payer: MEDICARE

## 2023-05-26 ENCOUNTER — APPOINTMENT (OUTPATIENT)
Dept: CARDIAC REHAB | Age: 68
End: 2023-05-26
Payer: MEDICARE

## 2023-05-31 ENCOUNTER — APPOINTMENT (OUTPATIENT)
Dept: CARDIAC REHAB | Age: 68
End: 2023-05-31
Payer: MEDICARE

## 2023-09-20 ENCOUNTER — OFFICE VISIT (OUTPATIENT)
Dept: CARDIOLOGY CLINIC | Age: 68
End: 2023-09-20
Payer: MEDICARE

## 2023-09-20 VITALS
HEART RATE: 73 BPM | DIASTOLIC BLOOD PRESSURE: 82 MMHG | HEIGHT: 71 IN | OXYGEN SATURATION: 96 % | SYSTOLIC BLOOD PRESSURE: 126 MMHG | WEIGHT: 220 LBS | BODY MASS INDEX: 30.8 KG/M2

## 2023-09-20 DIAGNOSIS — E78.5 HYPERLIPIDEMIA, UNSPECIFIED HYPERLIPIDEMIA TYPE: ICD-10-CM

## 2023-09-20 DIAGNOSIS — I25.5 ISCHEMIC CARDIOMYOPATHY: ICD-10-CM

## 2023-09-20 DIAGNOSIS — I50.22 CHRONIC SYSTOLIC HEART FAILURE (HCC): ICD-10-CM

## 2023-09-20 DIAGNOSIS — Z87.891 FORMER TOBACCO USE: ICD-10-CM

## 2023-09-20 DIAGNOSIS — Z98.61 CAD S/P PERCUTANEOUS CORONARY ANGIOPLASTY: Primary | ICD-10-CM

## 2023-09-20 DIAGNOSIS — I25.10 CAD S/P PERCUTANEOUS CORONARY ANGIOPLASTY: Primary | ICD-10-CM

## 2023-09-20 PROCEDURE — 1036F TOBACCO NON-USER: CPT | Performed by: INTERNAL MEDICINE

## 2023-09-20 PROCEDURE — G8417 CALC BMI ABV UP PARAM F/U: HCPCS | Performed by: INTERNAL MEDICINE

## 2023-09-20 PROCEDURE — 3017F COLORECTAL CA SCREEN DOC REV: CPT | Performed by: INTERNAL MEDICINE

## 2023-09-20 PROCEDURE — G8427 DOCREV CUR MEDS BY ELIG CLIN: HCPCS | Performed by: INTERNAL MEDICINE

## 2023-09-20 PROCEDURE — 99214 OFFICE O/P EST MOD 30 MIN: CPT | Performed by: INTERNAL MEDICINE

## 2023-09-20 PROCEDURE — 1123F ACP DISCUSS/DSCN MKR DOCD: CPT | Performed by: INTERNAL MEDICINE

## 2023-09-20 RX ORDER — VALSARTAN 40 MG/1
40 TABLET ORAL 2 TIMES DAILY
Qty: 180 TABLET | Refills: 3 | Status: SHIPPED | OUTPATIENT
Start: 2023-09-20

## 2023-09-20 RX ORDER — CLOPIDOGREL BISULFATE 75 MG/1
75 TABLET ORAL DAILY
Qty: 90 TABLET | Refills: 1 | Status: SHIPPED | OUTPATIENT
Start: 2023-09-20

## 2023-09-20 RX ORDER — SPIRONOLACTONE 25 MG/1
12.5 TABLET ORAL DAILY
Qty: 45 TABLET | Refills: 3 | Status: SHIPPED | OUTPATIENT
Start: 2023-09-20

## 2023-09-20 ASSESSMENT — ENCOUNTER SYMPTOMS
CONSTIPATION: 0
NAUSEA: 0
EYE PAIN: 0
RHINORRHEA: 0
COUGH: 0
VOMITING: 0
SORE THROAT: 0
BLOOD IN STOOL: 0
SHORTNESS OF BREATH: 0
PHOTOPHOBIA: 0
DIARRHEA: 0
ABDOMINAL PAIN: 0

## 2023-09-20 NOTE — PATIENT INSTRUCTIONS
Continue aspirin 81 mg daily, coreg 6.25 mg BID, atorvastatin 40 mg nightly and lasix 20 mg daily. START spironolactone 12.5 mg daily  Finish Brilinta and then START Plavix 75 mg until 12/7/23 then you can stop. STOP Entresto 24-26 mg BID and Valsartan 40 mg twice daily   Repeat lab work in 1 week - BMP and Magnesium after starting new medication. You can go to any [facility] facility. Limited Echocardiogram to access heart size and strength. Call [phone] to schedule this test  Continue to encourage heart healthy, low sodium diet and regular physical exercise for at least 30 minutes a minimum of 3-5 times per week. Follow up with me in 6 months. Call in December to make appt.

## 2023-10-02 NOTE — ED PROVIDER NOTES
[address] [facility]  EMERGENCY DEPARTMENT ENCOUNTER      Pt Name: Dariela Rock  MRN: [id number]  [place] 1955  Date of evaluation: 12/7/2022  Provider: Megan Virgen MD    CHIEF COMPLAINT       Chief Complaint   Patient presents with    Chest Pain     Chest pain x15 minutes-mid chest pain. Sattes he was sitting watching TV, started getting chest pain and then vomited. No cardiac history per pt. HISTORY OF PRESENT ILLNESS   (Location/Symptom, Timing/Onset,Context/Setting, Quality, Duration, Modifying Factors, Severity)  Note limiting factors. Dariela Rock is a 78 y.o. male who presents to the emergency department for chest pain. Patient states that he was sitting at home watching TV approximately 20 minutes prior to arrival when he felt his pressure-like midsternal chest pain. There was no radiation. No lightheadedness no shortness of breath but he did vomit. He has never had symptoms like this in the past.  The patient stated that he had his annual checkup with his primary care provider today. He was told that everything looked good. His blood pressure was normal.  He is not a known hypertensive. Not known diabetic. These have a history of hyperlipidemia. He is not a current smoker. Nursing notes were reviewed. REVIEW OF SYSTEMS    (2-9 systems for level 4, 10 or more for level 5)     Review of Systems   Constitutional:  Negative for fever. HENT:  Negative for sore throat. Eyes:  Negative for pain. Respiratory:  Negative for shortness of breath. Cardiovascular:  Positive for chest pain. Negative for palpitations. Gastrointestinal:  Negative for abdominal pain. Genitourinary:  Negative for flank pain. Musculoskeletal:  Negative for back pain. Neurological:  Negative for headaches. Otherwise limited due to acuity of condition. PAST MEDICAL HISTORY   History reviewed. No pertinent past medical history. SURGICALHISTORY     History reviewed.  No pertinent surgical history. CURRENT MEDICATIONS       Current Discharge Medication List        CONTINUE these medications which have NOT CHANGED    Details   atorvastatin (LIPITOR) 20 MG tablet TAKE ONE TABLET BY MOUTH DAILY  Qty: 90 tablet, Refills: 1             ALLERGIES     Patient has no known allergies. FAMILY HISTORY       Family History   Problem Relation Age of Onset    Diabetes Brother     Dementia Mother     Diabetes Maternal Grandfather     Diabetes Brother           SOCIAL HISTORY       Social History     Socioeconomic History    Marital status: Single     Spouse name: None    Number of children: None    Years of education: None    Highest education level: None   Tobacco Use    Smoking status: Former     Packs/day: 1.00     Years: 25.00     Pack years: 25.00     Types: Cigarettes     Quit date: 2006     Years since quittin.2    Smokeless tobacco: Never   Vaping Use    Vaping Use: Never used   Substance and Sexual Activity    Alcohol use: No    Drug use: No    Sexual activity: Yes     Partners: Female       SCREENINGS    Heron Coma Scale  Eye Opening: Spontaneous  Best Verbal Response: Oriented  Best Motor Response: Obeys commands  Bernie Coma Scale Score: 15        PHYSICAL EXAM    (up to 7 for level 4, 8 or more for level 5)     ED Triage Vitals   BP Temp Temp Source Heart Rate Resp SpO2 Height Weight   22 -- 22   (!) 154/100 97.9 °F (36.6 °C) Oral (!) 104 22 98 %  218 lb (98.9 kg)       Physical Exam  Vitals and nursing note reviewed. Constitutional:       Appearance: Normal appearance. He is well-developed. He is diaphoretic. He is not ill-appearing. HENT:      Head: Normocephalic and atraumatic. Right Ear: External ear normal.      Left Ear: External ear normal.      Nose: Nose normal.   Eyes:      General: No scleral icterus. Right eye: No discharge.          Left eye: No Treatment: NuStep, ambulation, balance    Activity/Treatment Tolerance:  [x]  Patient tolerated treatment well  []  Patient limited by fatigue  []  Patient limited by pain   []  Patient limited by medical complications  []  Other:     Assessment: 62year old female presents to therapy following left THR. She has lateral approach and is following that protocol. She will benefit from therapy to assist with improving ROM, strength, ambulation, balance. Body Structures/Functions/Activity Limitations: impaired activity tolerance, impaired endurance, impaired ROM, impaired sensation, impaired strength, pain, and abnormal gait  Prognosis: good    GOALS:  Patient Goal: \"Be able to go upstairs. \"    Short Term Goals:  Time Frame: 4 weeks  Improve HOOS, Jr to 10 or less to assist with getting in and out of house. Decrease left hip pain to 2/10 to assist with sleeping at night. Patient able to ascend/descend 4 steps using non-reciprocal pattern and B handrails to assist with going up to bedroom. Patient able to L SLR AROM 50 degrees to assist with getting in and out of bed. Patient able to ambulate with improved speed and decreased deviations to assist with walking into therapy. Long Term Goals:  Time Frame: 12 weeks  Independent with HEP and with progression to assist with increasing strength and improving ambulation. Patient Education:   [x]  HEP/Education Completed: Plan of Care, Goals, HEP  [facility] Access Code:  []  No new Education completed  []  Reviewed Prior HEP      []  Patient verbalized and/or demonstrated understanding of education provided. []  Patient unable to verbalize and/or demonstrate understanding of education provided. Will continue education.   [x]  Barriers to learning: None per patient    PLAN:  Treatment Recommendations: Strengthening, Range of Motion, Balance Training, Functional Mobility Training, Transfer Training, Endurance Training, Gait Training, Stair Training, Manual discharge. Conjunctiva/sclera: Conjunctivae normal.   Cardiovascular:      Rate and Rhythm: Normal rate and regular rhythm. Heart sounds: Normal heart sounds. Pulmonary:      Effort: Pulmonary effort is normal. No respiratory distress. Breath sounds: Normal breath sounds. No wheezing or rales. Abdominal:      General: Bowel sounds are normal. There is no distension. Palpations: Abdomen is soft. Tenderness: There is no abdominal tenderness. There is no guarding or rebound. Musculoskeletal:         General: No swelling, tenderness, deformity or signs of injury. Cervical back: Neck supple. Skin:     Coloration: Skin is not pale. Neurological:      Mental Status: He is alert. Psychiatric:         Mood and Affect: Mood normal.         Behavior: Behavior normal.           DIAGNOSTIC RESULTS     EKG: All EKG's are interpreted by the Emergency Department Physician who either signs or Co-signs this chart in the absence of a cardiologist.    12 lead EKG shows normal sinus rhythm with occasional PVCs at a rate of 93 bpm, DC interval QRS QTC normal.  Normal axis patient had acute ST segment elevation in the anterior leads. No obvious reciprocal changes. This is a significant change when compared to patient's prior EKG September 2018.     RADIOLOGY:   Non-plain film images such as CT, Ultrasound and MRI are read by the radiologist. Plain radiographic images are visualized and preliminarily interpreted by the emergency physician with the below findings:        Interpretation per the Radiologist below, if available at the time of this note:    XR CHEST PORTABLE    (Results Pending)         ED BEDSIDE ULTRASOUND:   Performed by ED Physician - none    LABS:  Labs Reviewed   CBC WITH AUTO DIFFERENTIAL - Abnormal; Notable for the following components:       Result Value    WBC 15.7 (*)     MPV 10.7 (*)     All other components within normal limits   COMPREHENSIVE METABOLIC PANEL W/ REFLEX TO MG FOR LOW K - Abnormal; Notable for the following components:    Glucose 138 (*)     Total Protein 8.3 (*)     Albumin/Globulin Ratio 0.9 (*)     All other components within normal limits   TROPONIN   APTT       All other labs were within normal range or not returned as of this dictation. EMERGENCY DEPARTMENT COURSE and DIFFERENTIAL DIAGNOSIS/MDM:   Vitals:    Vitals:    12/07/22 2047 12/07/22 2055 12/07/22 2101   BP: (!) 154/100 (!) 134/92 (!) 141/82   Pulse: (!) 104 93 80   Resp: 22 17    Temp: 97.9 °F (36.6 °C)     TempSrc: Oral     SpO2: 98% 95% 97%   Weight:  218 lb (98.9 kg)        Adult male who presented to the emergency room for chest pain. EKG was done almost immediately upon arrival.  This was brought to me and it appears to be a STEMI. The patient was brought back to resuscitation room he was placed on cardiac blood pressure and pulse oximetry monitoring. IV access was obtained by nursing staff. Patient is hypertensive, he is tachycardic. Code STEMI activated. Patient is given oral aspirin, oral Brilinta, he is given IV morphine, sublingual nitroglycerin and IV Zofran. He is also given IV heparin. I spoke to the interventional cardiologist Dr. Redd Reina, he does agree with the initial assessment and he does plan to take this patient to the cardiac Cath Lab. He requested 25 mg of metoprolol tartrate. This was ordered. Basic laboratory studies and chest x-ray ordered. Laboratory studies show leukocytosis otherwise no significant findings. Chest x-ray negative for any acute process. Patient's chart reviewed. No significant findings. I do see where the patient had an EKG done earlier in the day at his primary care office but I am unable to see the EKG. Patient reassessed after medications were given. His blood pressure and heart rate have both improved and his pain is trending down. He is no longer diaphoretic. Dr. Redd Reina is at bedside.     Patient taken to the cardiac Cath Lab shortly thereafter with no acute decompensation here in the emergency room. Throughout his ED stay the patient is updated on our findings and the treatment plan to which he is agreeable. I do asked the patient if he would like me to contact any family members and for now he prefers not to. The hospitalist is consulted and updated on this patient will be admitted to his service following Cath Lab. Is this patient to be included in the SEP-1 Core Measure due to severe sepsis or septic shock? No   Exclusion criteria - the patient is NOT to be included for SEP-1 Core Measure due to: Infection is not suspected     CRITICAL CARE TIME   Total Critical Care time was 30 minutes, excluding separately reportable procedures. There was a high probability of clinically significant/life threatening deterioration in the patient's condition which required my urgent intervention. CONSULTS:  None    PROCEDURES:       Procedures    FINAL IMPRESSION      1. ST elevation myocardial infarction (STEMI), unspecified artery [facility])          DISPOSITION/PLAN   DISPOSITION Decision To Admit 12/07/2022 09:20:56 PM      PATIENT REFERREDTO:  No follow-up provider specified.     DISCHARGEMEDICATIONS:  Current Discharge Medication List             (Please note that portions of this note were completed with a voice recognition program.  Efforts were made to edit the dictations but occasionally words are mis-transcribed.)    Gloria Villegas MD (electronically signed)  Attending Emergency Physician  I am the primary physician of record          Gloria Villegas MD  12/07/22 2996

## 2023-12-11 ENCOUNTER — TELEPHONE (OUTPATIENT)
Dept: CARDIOLOGY CLINIC | Age: 68
End: 2023-12-11

## 2023-12-11 NOTE — TELEPHONE ENCOUNTER
Pt wanting to know if he can stop taking Plavix and Valsartan. Pt sts he was only to take for 1 year and the time is up. Please advise.

## 2023-12-12 NOTE — TELEPHONE ENCOUNTER
Called and spoke with patient. Informed patient he is ok to stop Plavix as he is more than 1 year out from his MI per Dr Maldonado Oro. Removed Plavix from medication list.     Patient states he has not been taking valsartan that was ordered at last office visit as he says he was not aware he should have started this once his Entresto finished. I called his pharmacy who confirmed patient picked up script for valsartan on 10/5/23. Patient cannot recall this. Informed patient Dr Maldonado Oro would like him to take valsartan as soon as possible due to low EF. Patient verbally understood and states he will  script from pharmacy. Also informed patient he needs to reschedule his limited TTE at [facility] that he cancelled last month.     [facility] - [facility] only

## 2023-12-14 NOTE — TELEPHONE ENCOUNTER
Called and spoke with patient he states he went to [address] [facility] and they did not have his Valsartan. I spoke with the pharmacy staff and they stated they do have his script for valsartan. I informed patient of this. He is going to go back to pharmacy today to try to collect. I informed him if there are any issues to call the office or for the pharmacy staff to call us. Patient KATHY.

## 2024-01-02 DIAGNOSIS — I25.10 CAD S/P PERCUTANEOUS CORONARY ANGIOPLASTY: ICD-10-CM

## 2024-01-02 DIAGNOSIS — I50.21 ACUTE SYSTOLIC HEART FAILURE (HCC): ICD-10-CM

## 2024-01-02 DIAGNOSIS — Z98.61 CAD S/P PERCUTANEOUS CORONARY ANGIOPLASTY: ICD-10-CM

## 2024-01-02 DIAGNOSIS — E78.5 HYPERLIPIDEMIA, UNSPECIFIED HYPERLIPIDEMIA TYPE: ICD-10-CM

## 2024-01-02 DIAGNOSIS — I25.5 ISCHEMIC CARDIOMYOPATHY: ICD-10-CM

## 2024-01-02 NOTE — TELEPHONE ENCOUNTER
Last ov: 09/20/23 [facility]  Upcoming ov: no upcoming    BMP- 12/09/22  Lipid- 12/08/22  LFT- 12/08/22    Called and spoke with pt, relayed to get labs drawn. Pt v/u    Route to [facility] when labs are received.

## 2024-01-04 ENCOUNTER — HOSPITAL ENCOUNTER (OUTPATIENT)
Age: 69
Discharge: HOME | End: 2024-01-04
Payer: MEDICARE

## 2024-01-04 DIAGNOSIS — E78.5 HYPERLIPIDEMIA, UNSPECIFIED HYPERLIPIDEMIA TYPE: ICD-10-CM

## 2024-01-04 DIAGNOSIS — I25.10 CAD S/P PERCUTANEOUS CORONARY ANGIOPLASTY: ICD-10-CM

## 2024-01-04 DIAGNOSIS — Z98.61 CAD S/P PERCUTANEOUS CORONARY ANGIOPLASTY: ICD-10-CM

## 2024-01-04 DIAGNOSIS — I25.5 ISCHEMIC CARDIOMYOPATHY: ICD-10-CM

## 2024-01-04 DIAGNOSIS — I50.21 ACUTE SYSTOLIC HEART FAILURE (HCC): ICD-10-CM

## 2024-01-04 LAB
ANION GAP SERPL CALCULATED.3IONS-SCNC: 6 MMOL/L (ref 3–16)
BUN SERPL-MCNC: 13 MG/DL (ref 7–20)
CALCIUM SERPL-MCNC: 8.6 MG/DL (ref 8.3–10.6)
CHLORIDE SERPL-SCNC: 101 MMOL/L (ref 99–110)
CHOLEST SERPL-MCNC: 136 MG/DL (ref 0–199)
CO2 SERPL-SCNC: 30 MMOL/L (ref 21–32)
CREAT SERPL-MCNC: 1.1 MG/DL (ref 0.8–1.3)
GFR SERPLBLD CREATININE-BSD FMLA CKD-EPI: >60 ML/MIN/{1.73_M2}
GLUCOSE SERPL-MCNC: 100 MG/DL (ref 70–99)
HDLC SERPL-MCNC: 30 MG/DL (ref 40–60)
LDL CHOLESTEROL CALCULATED: 79 MG/DL
MAGNESIUM SERPL-MCNC: 2.3 MG/DL (ref 1.8–2.4)
POTASSIUM SERPL-SCNC: 4.2 MMOL/L (ref 3.5–5.1)
SODIUM SERPL-SCNC: 137 MMOL/L (ref 136–145)
TRIGL SERPL-MCNC: 137 MG/DL (ref 0–150)
VLDLC SERPL CALC-MCNC: 27 MG/DL

## 2024-01-04 PROCEDURE — 80061 LIPID PANEL: CPT

## 2024-01-04 PROCEDURE — 80048 BASIC METABOLIC PNL TOTAL CA: CPT

## 2024-01-04 PROCEDURE — 36415 COLL VENOUS BLD VENIPUNCTURE: CPT

## 2024-01-04 PROCEDURE — 83735 ASSAY OF MAGNESIUM: CPT

## 2024-01-04 RX ORDER — CARVEDILOL 6.25 MG/1
6.25 TABLET ORAL 2 TIMES DAILY WITH MEALS
Qty: 60 TABLET | Refills: 0 | Status: SHIPPED | OUTPATIENT
Start: 2024-01-04

## 2024-01-04 RX ORDER — FUROSEMIDE 20 MG/1
20 TABLET ORAL DAILY
Qty: 30 TABLET | Refills: 0 | Status: SHIPPED | OUTPATIENT
Start: 2024-01-04

## 2024-01-04 RX ORDER — ATORVASTATIN CALCIUM 40 MG/1
40 TABLET, FILM COATED ORAL NIGHTLY
Qty: 30 TABLET | Refills: 0 | Status: SHIPPED | OUTPATIENT
Start: 2024-01-04 | End: 2024-01-05 | Stop reason: SDUPTHER

## 2024-01-05 DIAGNOSIS — Z98.61 CAD S/P PERCUTANEOUS CORONARY ANGIOPLASTY: ICD-10-CM

## 2024-01-05 DIAGNOSIS — I25.10 CAD S/P PERCUTANEOUS CORONARY ANGIOPLASTY: ICD-10-CM

## 2024-01-05 DIAGNOSIS — E78.5 HYPERLIPIDEMIA, UNSPECIFIED HYPERLIPIDEMIA TYPE: ICD-10-CM

## 2024-01-05 RX ORDER — ATORVASTATIN CALCIUM 80 MG/1
80 TABLET, FILM COATED ORAL NIGHTLY
Qty: 90 TABLET | Refills: 3 | Status: SHIPPED | OUTPATIENT
Start: 2024-01-05

## 2024-01-05 NOTE — TELEPHONE ENCOUNTER
----- Message from Choco Spears DO sent at 1/5/2024  3:53 PM EST -----  Please let patient know that his creatinine and electrolytes are stable.    His LDL or \"bad cholesterol\" is elevated above goal range given his history of CAD.    Will recommend increasing atorvastatin to 80 mg nightly.

## 2024-02-07 DIAGNOSIS — I25.10 CAD S/P PERCUTANEOUS CORONARY ANGIOPLASTY: ICD-10-CM

## 2024-02-07 DIAGNOSIS — Z98.61 CAD S/P PERCUTANEOUS CORONARY ANGIOPLASTY: ICD-10-CM

## 2024-02-07 DIAGNOSIS — I25.5 ISCHEMIC CARDIOMYOPATHY: ICD-10-CM

## 2024-02-07 DIAGNOSIS — I50.21 ACUTE SYSTOLIC HEART FAILURE (HCC): ICD-10-CM

## 2024-02-07 RX ORDER — CARVEDILOL 6.25 MG/1
6.25 TABLET ORAL 2 TIMES DAILY WITH MEALS
Qty: 180 TABLET | Refills: 2 | Status: SHIPPED | OUTPATIENT
Start: 2024-02-07

## 2024-02-07 RX ORDER — FUROSEMIDE 20 MG/1
20 TABLET ORAL DAILY
Qty: 90 TABLET | Refills: 2 | Status: SHIPPED | OUTPATIENT
Start: 2024-02-07

## 2024-04-22 ASSESSMENT — ENCOUNTER SYMPTOMS
ABDOMINAL PAIN: 0
DIARRHEA: 0
EYE PAIN: 0
RHINORRHEA: 0
PHOTOPHOBIA: 0
VOMITING: 0
COUGH: 0
SORE THROAT: 0
CONSTIPATION: 0
SHORTNESS OF BREATH: 0
BLOOD IN STOOL: 0
NAUSEA: 0

## 2024-04-22 NOTE — PROGRESS NOTES
[facility]   Cardiovascular Evaluation    PATIENT: Colby Mares  DATE: 2024  MRN: [id number]  CSN: [id number]  : 1955      Primary Care Doctor: Carlos Manuel Degroot MD  Reason for evaluation:   Follow-up for CAD, ischemic cardiomyopathy    Subjective:     Colby Mares is a 68 y.o. male with a history of CAD s/p acute anterior STEMI and PCI of mid-LAD with Ulysses [place] 2.75 x 38 mm MANOLO (tapered to ~3.6 mm) on 22, chronic LV systolic heart failure secondary to ischemic cardiomyopathy, hyperlipidemia, and former tobacco use who presents for follow-up.    The patient was admitted to [facility] on 22 with an acute anterior STEMI and underwent emergent invasive coronary angiography demonstrating severe single-vessel coronary artery disease with a mid-LAD culprit lesion.  At that time, he underwent PCI of his mid-LAD with an [place] 2.75 x 38 mm MANOLO, tapered to ~3.6 mm.  There was a focal 70% residual stenosis of the small caliber apical LAD for which medical therapy was recommended.  TTE obtained during his admission showed an LVEF of 35-40% with hypokinesis of the mid-anterior, anteroseptal, inferoseptal, apical anterior, apical septal, and apical lateral walls, grade 2 diastolic dysfunction, normal RV size and systolic function, mild mitral regurgitation, mild tricuspid regurgitation, and mild pulmonary hypertension with SPAP estimated at 40 mmHg.    Today, the patient reports that he continues to feel well from a cardiovascular standpoint.  He denies any chest pain, shortness of breath, lightheadedness, dizziness, palpitations, or lower extremity edema.  He notes some occasional lightheadedness with position changes.  He has not yet gotten the repeat TTE that was ordered at his previous visit to re-evaluate his cardiac function.       Past Medical History:   has a past medical history of CAD S/P percutaneous coronary angioplasty and

## 2024-04-24 ENCOUNTER — OFFICE VISIT (OUTPATIENT)
Dept: CARDIOLOGY CLINIC | Age: 69
End: 2024-04-24

## 2024-04-24 VITALS
SYSTOLIC BLOOD PRESSURE: 130 MMHG | HEIGHT: 71 IN | WEIGHT: 219.5 LBS | DIASTOLIC BLOOD PRESSURE: 74 MMHG | HEART RATE: 84 BPM | BODY MASS INDEX: 30.73 KG/M2 | OXYGEN SATURATION: 95 %

## 2024-04-24 DIAGNOSIS — I25.5 ISCHEMIC CARDIOMYOPATHY: ICD-10-CM

## 2024-04-24 DIAGNOSIS — R42 LIGHTHEADEDNESS: ICD-10-CM

## 2024-04-24 DIAGNOSIS — E78.5 HYPERLIPIDEMIA, UNSPECIFIED HYPERLIPIDEMIA TYPE: ICD-10-CM

## 2024-04-24 DIAGNOSIS — Z87.891 FORMER TOBACCO USE: ICD-10-CM

## 2024-04-24 DIAGNOSIS — I50.22 CHRONIC SYSTOLIC HEART FAILURE (HCC): ICD-10-CM

## 2024-04-24 DIAGNOSIS — I25.10 CAD S/P PERCUTANEOUS CORONARY ANGIOPLASTY: Primary | ICD-10-CM

## 2024-04-24 DIAGNOSIS — Z98.61 CAD S/P PERCUTANEOUS CORONARY ANGIOPLASTY: Primary | ICD-10-CM

## 2024-06-10 ENCOUNTER — TELEPHONE (OUTPATIENT)
Dept: CARDIOLOGY CLINIC | Age: 69
End: 2024-06-10

## 2024-06-10 DIAGNOSIS — I25.10 CAD S/P PERCUTANEOUS CORONARY ANGIOPLASTY: Primary | ICD-10-CM

## 2024-06-10 DIAGNOSIS — Z98.61 CAD S/P PERCUTANEOUS CORONARY ANGIOPLASTY: Primary | ICD-10-CM

## 2024-06-10 NOTE — TELEPHONE ENCOUNTER
Echo re-ordered. Attempted to reach pt, left vm to call office back.  Please relay to pt to call CS.

## 2024-06-10 NOTE — TELEPHONE ENCOUNTER
CS contacted office requesting order for echo be placed to schedule test. Current order is from last year and does not have expected date.

## 2024-07-19 ENCOUNTER — HOSPITAL ENCOUNTER (OUTPATIENT)
Dept: CARDIOLOGY | Age: 69
End: 2024-07-19
Attending: INTERNAL MEDICINE
Payer: MEDICARE

## 2024-07-19 VITALS
HEIGHT: 71 IN | WEIGHT: 220 LBS | SYSTOLIC BLOOD PRESSURE: 126 MMHG | BODY MASS INDEX: 30.8 KG/M2 | DIASTOLIC BLOOD PRESSURE: 82 MMHG

## 2024-07-19 DIAGNOSIS — Z98.61 CAD S/P PERCUTANEOUS CORONARY ANGIOPLASTY: ICD-10-CM

## 2024-07-19 DIAGNOSIS — I25.10 CAD S/P PERCUTANEOUS CORONARY ANGIOPLASTY: ICD-10-CM

## 2024-07-19 LAB
ECHO AO ASC DIAM: 3.2 CM
ECHO AO ASCENDING AORTA INDEX: 1.45 CM/M2
ECHO AO ROOT DIAM: 3.3 CM
ECHO AO ROOT INDEX: 1.5 CM/M2
ECHO AV CUSP MM: 1.3 CM
ECHO AV PEAK GRADIENT: 10 MMHG
ECHO AV PEAK VELOCITY: 1.6 M/S
ECHO AV VELOCITY RATIO: 0.56
ECHO BSA: 2.24 M2
ECHO LA AREA 2C: 23.3 CM2
ECHO LA AREA 4C: 21.1 CM2
ECHO LA DIAMETER INDEX: 1.5 CM/M2
ECHO LA DIAMETER: 3.3 CM
ECHO LA MAJOR AXIS: 6.1 CM
ECHO LA MINOR AXIS: 6.9 CM
ECHO LA TO AORTIC ROOT RATIO: 1
ECHO LA VOL BP: 65 ML (ref 18–58)
ECHO LA VOL MOD A2C: 64 ML (ref 18–58)
ECHO LA VOL MOD A4C: 58 ML (ref 18–58)
ECHO LA VOL/BSA BIPLANE: 30 ML/M2 (ref 16–34)
ECHO LA VOLUME INDEX MOD A2C: 29 ML/M2 (ref 16–34)
ECHO LA VOLUME INDEX MOD A4C: 26 ML/M2 (ref 16–34)
ECHO LV E' LATERAL VELOCITY: 7 CM/S
ECHO LV E' SEPTAL VELOCITY: 9 CM/S
ECHO LV FRACTIONAL SHORTENING: 40 % (ref 28–44)
ECHO LV INTERNAL DIMENSION DIASTOLE INDEX: 2.18 CM/M2
ECHO LV INTERNAL DIMENSION DIASTOLIC: 4.8 CM (ref 4.2–5.9)
ECHO LV INTERNAL DIMENSION SYSTOLIC INDEX: 1.32 CM/M2
ECHO LV INTERNAL DIMENSION SYSTOLIC: 2.9 CM
ECHO LV ISOVOLUMETRIC RELAXATION TIME (IVRT): 81 MS
ECHO LV IVSD: 0.9 CM (ref 0.6–1)
ECHO LV MASS 2D: 158.8 G (ref 88–224)
ECHO LV MASS INDEX 2D: 72.2 G/M2 (ref 49–115)
ECHO LV POSTERIOR WALL DIASTOLIC: 1 CM (ref 0.6–1)
ECHO LV RELATIVE WALL THICKNESS RATIO: 0.42
ECHO LVOT PEAK GRADIENT: 3 MMHG
ECHO LVOT PEAK VELOCITY: 0.9 M/S
ECHO MV A VELOCITY: 1.05 M/S
ECHO MV E VELOCITY: 1.05 M/S
ECHO MV E/A RATIO: 1
ECHO MV E/E' LATERAL: 15
ECHO MV E/E' RATIO (AVERAGED): 13.33
ECHO MV E/E' SEPTAL: 11.67
ECHO RV TAPSE: 2.5 CM (ref 1.7–?)

## 2024-07-19 PROCEDURE — 93306 TTE W/DOPPLER COMPLETE: CPT

## 2024-07-19 PROCEDURE — 93306 TTE W/DOPPLER COMPLETE: CPT | Performed by: INTERNAL MEDICINE

## 2024-07-22 ENCOUNTER — TELEPHONE (OUTPATIENT)
Dept: CARDIOLOGY CLINIC | Age: 69
End: 2024-07-22

## 2024-07-22 NOTE — TELEPHONE ENCOUNTER
Colby Mares, 1955    Cardiac Risk Assessment    What type of procedure are you having?  Colonoscopy with biopsy    When is your procedure scheduled for?  TBD    Medications to be stopped.  None requested    What physician is performing your procedure?  Dr. De Dios    Phone Number:   [phone]    Fax number to send the letter:   [phone]    Cardiologist:   DORENE    Last Appointment:   4/24/24    Next Appointment:   No upcoming    [facility] OOT

## 2024-07-23 NOTE — TELEPHONE ENCOUNTER
Dr. De Dios office called to check on status of Cardiac Clearance.  Advised that KM not in office this week.  Will respond when back in office

## 2024-07-28 NOTE — TELEPHONE ENCOUNTER
based on most recent outpatient cardiology evaluation 3 months ago, patient is intermediate risk from cardiac standpoint for planned procedure if it is done with general anesthesia.  Patient is to continue all cardiac medications in the perioperative phase.  No further cardiac testing or intervention is indicated before planned colonoscopy with biopsy.

## 2024-09-12 RX ORDER — SPIRONOLACTONE 25 MG/1
12.5 TABLET ORAL DAILY
Qty: 45 TABLET | Refills: 1 | Status: SHIPPED | OUTPATIENT
Start: 2024-09-12

## 2024-10-18 RX ORDER — VALSARTAN 40 MG/1
40 TABLET ORAL 2 TIMES DAILY
Qty: 180 TABLET | Refills: 2 | Status: SHIPPED | OUTPATIENT
Start: 2024-10-18

## 2024-10-18 NOTE — TELEPHONE ENCOUNTER
Refill  valsartan (DIOVAN) 40 MG tablet   [facility] [id number] - [address] [place]  [place] [phone] - [place] [phone]     Lov 4/2024  Next 1/8/25

## 2024-11-18 DIAGNOSIS — I25.5 ISCHEMIC CARDIOMYOPATHY: ICD-10-CM

## 2024-11-18 DIAGNOSIS — I25.10 CAD S/P PERCUTANEOUS CORONARY ANGIOPLASTY: ICD-10-CM

## 2024-11-18 DIAGNOSIS — I50.21 ACUTE SYSTOLIC HEART FAILURE (HCC): ICD-10-CM

## 2024-11-18 DIAGNOSIS — Z98.61 CAD S/P PERCUTANEOUS CORONARY ANGIOPLASTY: ICD-10-CM

## 2024-11-18 RX ORDER — FUROSEMIDE 20 MG/1
20 TABLET ORAL DAILY
Qty: 90 TABLET | Refills: 1 | Status: SHIPPED | OUTPATIENT
Start: 2024-11-18

## 2024-11-18 NOTE — TELEPHONE ENCOUNTER
Last Office Visit: 4/24/2024 Provider: DORENE  Is provider OOT? No    Next Office Visit: 1/8/2025 Provider: DORENE    **Has patient already had 30 day supply? No    LAST LABS:   BMP:   Lab Results   Component Value Date/Time     01/04/2024 07:57 AM    K 4.2 01/04/2024 07:57 AM    K 3.7 12/09/2022 04:36 AM     01/04/2024 07:57 AM    CO2 30 01/04/2024 07:57 AM    BUN 13 01/04/2024 07:57 AM    CREATININE 1.1 01/04/2024 07:57 AM    GLUCOSE 100 01/04/2024 07:57 AM    CALCIUM 8.6 01/04/2024 07:57 AM    LABGLOM >60 01/04/2024 07:57 AM     **Check Care Everywhere? yes -    **Lab orders needed? no -      Is encounter provider correct?   Yes  Does refill dosage match last filled?   Yes  Changes to script from Tele Encounters or LOV Plan?   no  Did you adjust dispensed amount or refills to reflect last and upcoming OV?  Yes and No    Requested Prescriptions     Pending Prescriptions Disp Refills    furosemide (LASIX) 20 MG tablet 90 tablet 1     Sig: Take 1 tablet by mouth daily

## 2024-12-30 DIAGNOSIS — I25.10 CAD S/P PERCUTANEOUS CORONARY ANGIOPLASTY: ICD-10-CM

## 2024-12-30 DIAGNOSIS — Z98.61 CAD S/P PERCUTANEOUS CORONARY ANGIOPLASTY: ICD-10-CM

## 2024-12-30 RX ORDER — CARVEDILOL 6.25 MG/1
6.25 TABLET ORAL 2 TIMES DAILY WITH MEALS
Qty: 180 TABLET | Refills: 0 | Status: SHIPPED | OUTPATIENT
Start: 2024-12-30

## 2024-12-30 NOTE — TELEPHONE ENCOUNTER
Refill    carvedilol (COREG) 6.25 MG tablet   [facility] [id number] - [address] [place] [phone] - [place] [phone]     Lov 4/24/24  Next 1/8/25

## 2024-12-31 NOTE — PROGRESS NOTES
[facility]   Cardiovascular Evaluation    PATIENT: Colby Mares  DATE: 2024  MRN: [id number]  CSN: [id number]  : 1955      Primary Care Doctor: Carlos Manuel Degroot MD  Reason for evaluation:   Follow-up for CAD, ischemic cardiomyopathy    Subjective:     Colby Mares is a 69 y.o. male with a history of CAD s/p acute anterior STEMI and PCI of mid-LAD with [place] 2.75 x 38 mm MANOLO (tapered to ~3.6 mm) on 22, chronic LV systolic heart failure secondary to ischemic cardiomyopathy, hyperlipidemia, and former tobacco use who presents for follow-up.    The patient was admitted to [facility] on 22 with an acute anterior STEMI and underwent emergent invasive coronary angiography demonstrating severe single-vessel coronary artery disease with a mid-LAD culprit lesion.  At that time, he underwent PCI of his mid-LAD with an Ulysses [place] 2.75 x 38 mm MANOLO, tapered to ~3.6 mm.  There was a focal 70% residual stenosis of the small caliber apical LAD for which medical therapy was recommended.  TTE obtained during his admission showed an LVEF of 35-40% with hypokinesis of the mid-anterior, anteroseptal, inferoseptal, apical anterior, apical septal, and apical lateral walls, grade 2 diastolic dysfunction, normal RV size and systolic function, mild mitral regurgitation, mild tricuspid regurgitation, and mild pulmonary hypertension with SPAP estimated at 40 mmHg.    Patient was last seen 24 and presents today for a follow up.              Past Medical History:   has a past medical history of CAD S/P percutaneous coronary angioplasty and Hyperlipidemia.    Surgical History:   has no past surgical history on file.     Social History:   reports that he quit smoking about 18 years ago. His smoking use included cigarettes. He started smoking about 43 years ago. He has a 25 pack-year smoking history. He has never used smokeless tobacco. He reports that he does not drink

## 2025-01-07 NOTE — PROGRESS NOTES
[facility]   Cardiovascular Evaluation    PATIENT: Colby Mares  DATE: 2025  MRN: [id number]  CSN: [id number]  : 1955      Primary Care Doctor: Carlos Manuel Degroot MD  Reason for evaluation:   Follow-up for CAD, ischemic cardiomyopathy with recovered LVEF    Subjective:     Colby Mares is a 69 y.o. male with a history of CAD s/p acute anterior STEMI and PCI of mid-LAD with [place] 2.75 x 38 mm MANOLO (tapered to ~3.6 mm) on 22, ischemic cardiomyopathy with recovered LVEF, hyperlipidemia, and former tobacco use who presents for follow-up.    The patient was admitted to [facility] on 22 with an acute anterior STEMI and underwent emergent invasive coronary angiography demonstrating severe single-vessel coronary artery disease with a mid-LAD culprit lesion.  At that time, he underwent PCI of his mid-LAD with an Ulysses [place] 2.75 x 38 mm MANOLO, tapered to ~3.6 mm.  There was a focal 70% residual stenosis of the small caliber apical LAD for which medical therapy was recommended.  TTE obtained during his admission showed an LVEF of 35-40% with hypokinesis of the mid-anterior, anteroseptal, inferoseptal, apical anterior, apical septal, and apical lateral walls, grade 2 diastolic dysfunction, normal RV size and systolic function, mild mitral regurgitation, mild tricuspid regurgitation, and mild pulmonary hypertension with SPAP estimated at 40 mmHg.  A repeat TTE from 2024 showed an LVEF of 55% with normal wall motion, normal LV diastolic function, normal RV size and systolic function, mild aortic valve sclerosis, and mild posterior mitral annular calcification.    Today, the patient reports that he has generally been feeling well from a cardiovascular standpoint.  He denies any chest pain.  He says that he only gets some shortness of breath with heavy exertion.  He denies any lightheadedness, dizziness, palpitations, or lower extremity edema.  He says that he

## 2025-01-08 ENCOUNTER — OFFICE VISIT (OUTPATIENT)
Dept: CARDIOLOGY CLINIC | Age: 70
End: 2025-01-08
Payer: MEDICARE

## 2025-01-08 VITALS
OXYGEN SATURATION: 96 % | DIASTOLIC BLOOD PRESSURE: 60 MMHG | SYSTOLIC BLOOD PRESSURE: 118 MMHG | BODY MASS INDEX: 30.03 KG/M2 | WEIGHT: 214.5 LBS | HEIGHT: 71 IN | HEART RATE: 80 BPM

## 2025-01-08 DIAGNOSIS — E78.5 HYPERLIPIDEMIA, UNSPECIFIED HYPERLIPIDEMIA TYPE: ICD-10-CM

## 2025-01-08 DIAGNOSIS — I25.10 CAD S/P PERCUTANEOUS CORONARY ANGIOPLASTY: Primary | ICD-10-CM

## 2025-01-08 DIAGNOSIS — R42 LIGHTHEADEDNESS: ICD-10-CM

## 2025-01-08 DIAGNOSIS — Z87.891 FORMER TOBACCO USE: ICD-10-CM

## 2025-01-08 DIAGNOSIS — I25.5 ISCHEMIC CARDIOMYOPATHY: ICD-10-CM

## 2025-01-08 DIAGNOSIS — Z98.61 CAD S/P PERCUTANEOUS CORONARY ANGIOPLASTY: Primary | ICD-10-CM

## 2025-01-08 PROCEDURE — 3017F COLORECTAL CA SCREEN DOC REV: CPT | Performed by: INTERNAL MEDICINE

## 2025-01-08 PROCEDURE — 1036F TOBACCO NON-USER: CPT | Performed by: INTERNAL MEDICINE

## 2025-01-08 PROCEDURE — 99214 OFFICE O/P EST MOD 30 MIN: CPT | Performed by: INTERNAL MEDICINE

## 2025-01-08 PROCEDURE — 1159F MED LIST DOCD IN RCRD: CPT | Performed by: INTERNAL MEDICINE

## 2025-01-08 PROCEDURE — G8417 CALC BMI ABV UP PARAM F/U: HCPCS | Performed by: INTERNAL MEDICINE

## 2025-01-08 PROCEDURE — G8427 DOCREV CUR MEDS BY ELIG CLIN: HCPCS | Performed by: INTERNAL MEDICINE

## 2025-01-08 PROCEDURE — 1123F ACP DISCUSS/DSCN MKR DOCD: CPT | Performed by: INTERNAL MEDICINE

## 2025-01-08 PROCEDURE — M1308 PR FLU IMMUNIZE NO ADMIN: HCPCS | Performed by: INTERNAL MEDICINE

## 2025-01-08 RX ORDER — ATORVASTATIN CALCIUM 80 MG/1
80 TABLET, FILM COATED ORAL NIGHTLY
Qty: 90 TABLET | Refills: 3 | Status: SHIPPED | OUTPATIENT
Start: 2025-01-08

## 2025-01-08 RX ORDER — NITROGLYCERIN 0.4 MG/1
0.4 TABLET SUBLINGUAL EVERY 5 MIN PRN
Qty: 25 TABLET | Refills: 3 | Status: SHIPPED | OUTPATIENT
Start: 2025-01-08

## 2025-01-08 NOTE — PATIENT INSTRUCTIONS
PLAN:  Will continue aspirin 81 mg daily, atorvastatin 80 mg qHS, coreg 6.25 mg BID, valsartan 40 mg BID, spironolactone 12.5 mg daily, and lasix 20 mg daily.   Continue to encourage heart healthy, low sodium diet and regular physical exercise for at least 30 minutes a minimum of 3-5 times per week.  Recommend patient always rise slowly from lying and sitting positions and sit or lie down immediately whenever he feels lightheaded.  Follow up in 6 months

## 2025-03-27 DIAGNOSIS — Z79.899 MEDICATION MANAGEMENT: Primary | ICD-10-CM

## 2025-03-27 DIAGNOSIS — I25.10 CAD S/P PERCUTANEOUS CORONARY ANGIOPLASTY: ICD-10-CM

## 2025-03-27 DIAGNOSIS — Z98.61 CAD S/P PERCUTANEOUS CORONARY ANGIOPLASTY: ICD-10-CM

## 2025-03-27 RX ORDER — CARVEDILOL 6.25 MG/1
TABLET ORAL
Qty: 180 TABLET | Refills: 2 | Status: SHIPPED | OUTPATIENT
Start: 2025-03-27

## 2025-03-27 NOTE — TELEPHONE ENCOUNTER
Front please schedule pt OV.    Attempted to reach pt, unable to lvm to call office back. Inform pt to complete blood work.     [facility] RX pending     Last Office Visit: 1/8/2025 Provider: DORENE  **Is provider OOT? No    Next Office Visit: Visit date not found Provider:   **If no OV, when does pt need to be seen? in 6 month(s)       LAST LABS:   BMP:   Lab Results   Component Value Date/Time     01/04/2024 07:57 AM    K 4.2 01/04/2024 07:57 AM    K 3.7 12/09/2022 04:36 AM     01/04/2024 07:57 AM    CO2 30 01/04/2024 07:57 AM    BUN 13 01/04/2024 07:57 AM    CREATININE 1.1 01/04/2024 07:57 AM    GLUCOSE 100 01/04/2024 07:57 AM    CALCIUM 8.6 01/04/2024 07:57 AM    LABGLOM >60 01/04/2024 07:57 AM       Lab orders needed? yes - BMP

## 2025-03-31 NOTE — TELEPHONE ENCOUNTER
Medication Refill    Medication needing refilled:  Fpironclactone (do not see this medication on chart -- pt spelled on the phone)    Dosage of the medication:  15mg - taken in half    How are you taking this medication (QD, BID, TID, QID, PRN):  Once per day    30 or 90 day supply called in:  90 day supply    When will you run out of your medication:  About 2 days left    Which Pharmacy are we sending the medication to?:  [id number] - [place], [place] - [address] [place] [address] - P [phone] - F [phone]  [address] [place] [address]  Phone: [phone]  Fax: [phone]     LOV: 1/8/25 [facility]    Best number is [phone]    Pt stated that he is still thinking about if he would like to follow [facility] or reestablish with another interventionalist jake/ Carmela.

## 2025-03-31 NOTE — TELEPHONE ENCOUNTER
Attempted to reach pt, left vm to call office back.  Please confirm he is taking 12.5 mg (half of 25mg) of Spironolactone

## 2025-04-01 RX ORDER — SPIRONOLACTONE 25 MG/1
12.5 TABLET ORAL DAILY
Qty: 45 TABLET | Refills: 0 | Status: SHIPPED | OUTPATIENT
Start: 2025-04-01

## 2025-04-01 NOTE — TELEPHONE ENCOUNTER
Called pt at [phone] to schedule 6 mo f/u to continue filling medication. Left message on voicemail.

## 2025-04-01 NOTE — TELEPHONE ENCOUNTER
Spoke with pt, pt is taking 12.5 spironolactone daily    [facility] OOT  No upcoming with another provider   [facility] will you fill    Front- please make 6mo f/u with new provider, if pt is still unsure about following [facility] please relay he is due for f/u in June/July so for further refills from us he will need to see another provider. We will send in 90 days

## 2025-04-03 NOTE — TELEPHONE ENCOUNTER
4.3.25- second attempt  Called pt at [phone], LVM to xavi nd schedule 6 mon f/u (in June / July) w/ new provider

## 2025-06-27 RX ORDER — SPIRONOLACTONE 25 MG/1
TABLET ORAL
Qty: 45 TABLET | Refills: 0 | Status: SHIPPED | OUTPATIENT
Start: 2025-06-27

## 2025-06-27 NOTE — TELEPHONE ENCOUNTER
Last Office Visit: 1/8/2025 Provider: DORENE  **Is provider OOT? Yes    Next Office Visit: 7/7/2025 Provider: AMAURY  *  LAST LABS:   BMP:  Lab Results   Component Value Date/Time     01/04/2024 07:57 AM    K 4.2 01/04/2024 07:57 AM    K 3.7 12/09/2022 04:36 AM     01/04/2024 07:57 AM    CO2 30 01/04/2024 07:57 AM    BUN 13 01/04/2024 07:57 AM    CREATININE 1.1 01/04/2024 07:57 AM    GLUCOSE 100 01/04/2024 07:57 AM    CALCIUM 8.6 01/04/2024 07:57 AM    LABGLOM >60 01/04/2024 07:57 AM      Lab orders needed? no

## 2025-07-07 ENCOUNTER — OFFICE VISIT (OUTPATIENT)
Dept: CARDIOLOGY CLINIC | Age: 70
End: 2025-07-07
Payer: MEDICARE

## 2025-07-07 VITALS
HEART RATE: 68 BPM | HEIGHT: 71 IN | WEIGHT: 217 LBS | BODY MASS INDEX: 30.38 KG/M2 | SYSTOLIC BLOOD PRESSURE: 126 MMHG | OXYGEN SATURATION: 95 % | DIASTOLIC BLOOD PRESSURE: 74 MMHG

## 2025-07-07 DIAGNOSIS — I25.5 ISCHEMIC CARDIOMYOPATHY: ICD-10-CM

## 2025-07-07 DIAGNOSIS — E78.5 HYPERLIPIDEMIA, UNSPECIFIED HYPERLIPIDEMIA TYPE: ICD-10-CM

## 2025-07-07 DIAGNOSIS — Z98.61 CAD S/P PERCUTANEOUS CORONARY ANGIOPLASTY: ICD-10-CM

## 2025-07-07 DIAGNOSIS — I25.10 CAD S/P PERCUTANEOUS CORONARY ANGIOPLASTY: ICD-10-CM

## 2025-07-07 DIAGNOSIS — Z76.89 ESTABLISHING CARE WITH NEW DOCTOR, ENCOUNTER FOR: Primary | ICD-10-CM

## 2025-07-07 PROCEDURE — 3017F COLORECTAL CA SCREEN DOC REV: CPT | Performed by: INTERNAL MEDICINE

## 2025-07-07 PROCEDURE — 99214 OFFICE O/P EST MOD 30 MIN: CPT | Performed by: INTERNAL MEDICINE

## 2025-07-07 PROCEDURE — 1159F MED LIST DOCD IN RCRD: CPT | Performed by: INTERNAL MEDICINE

## 2025-07-07 PROCEDURE — 1123F ACP DISCUSS/DSCN MKR DOCD: CPT | Performed by: INTERNAL MEDICINE

## 2025-07-07 PROCEDURE — G8427 DOCREV CUR MEDS BY ELIG CLIN: HCPCS | Performed by: INTERNAL MEDICINE

## 2025-07-07 PROCEDURE — 1036F TOBACCO NON-USER: CPT | Performed by: INTERNAL MEDICINE

## 2025-07-07 PROCEDURE — 93000 ELECTROCARDIOGRAM COMPLETE: CPT | Performed by: INTERNAL MEDICINE

## 2025-07-07 PROCEDURE — G8417 CALC BMI ABV UP PARAM F/U: HCPCS | Performed by: INTERNAL MEDICINE

## 2025-07-07 RX ORDER — VALSARTAN 80 MG/1
80 TABLET ORAL NIGHTLY
Qty: 90 TABLET | Refills: 3 | Status: SHIPPED | OUTPATIENT
Start: 2025-07-07

## 2025-07-07 RX ORDER — METOPROLOL SUCCINATE 50 MG/1
50 TABLET, EXTENDED RELEASE ORAL DAILY
Qty: 90 TABLET | Refills: 3 | Status: SHIPPED | OUTPATIENT
Start: 2025-07-07

## 2025-07-07 NOTE — PATIENT INSTRUCTIONS
~Stop Coreg and start Toprol XL 50 mg daily in the mornings - also called Metoprolol succinate  ~Take Valsartan 80 mg in the evenings   ~Can stop spironolactone after 2 weeks if you do not have any swelling or weight gain while off lasix   ~Agree with seeing pulmonary   ~Monitor blood pressure and heart rate at home daily while at rest. Goal blood pressure at rest is 120-130/70/80's, rarely over 140/90.      Cardiac medications reviewed including indications and pertinent side effects. Medication list updated at this visit.   Patient verbalizes understanding of the need for treatment and education has been provided at today's visit. Additional education material will be provided in after visit summary.    Check blood pressure and heart rate at home a few times per week- keep a log with dates and times and bring to office visit   Regular exercise and following a healthy diet encouraged   Follow up with me in 6 months

## 2025-07-07 NOTE — PROGRESS NOTES
vessel with a 30% ostial stenosis.  D. Right coronary artery: Co-dominant vessel. The RCA has a 30% proximal/mid-vessel stenosis.  The remainder of the vessel has minor luminal irregularities.     3. Left ventriculography;  A. LVEF 40% with hypokinesis of the anterior and apical walls.  B. 1+ mitral regurgitation.     Results of Intervention (PCI of mid-LAD):  Pre - 95% stenosis, FABIAN 2 flow  Post - 10% stenosis, FABIAN 3 flow      TTE 7/19/24:  Image quality is adequate.  Left Ventricle: Normal left ventricular systolic function with a visually estimated EF of 55%. Left ventricle size is normal. Normal wall thickness. Normal wall motion. Normal diastolic function.  Right Ventricle: Right ventricle size is normal. Normal systolic function. TAPSE is normal.  Aortic Valve: Mild sclerosis of the aortic valve cusps. No stenosis.  Mitral Valve: Mild annular calcification at the posterior leaflet.  Tricuspid Valve: Unable to assess RVSP due to inadequate or insignificant tricuspid regurgitation.    CT chest 11/26/2024  IMPRESSION   Pulmonary infiltrates including honeycombing and reticular nodular opacities greatest at the lung bases and anteriorly with mild traction bronchiectasis      Latest Reference Range & Units 01/04/24 07:57   Cholesterol, Fasting 0 - 199 mg/dL 136   HDL Cholesterol 40 - 60 mg/dL 30 (L)   LDL Cholesterol <100 mg/dL 79   VLDL Not Established mg/dL 27   Triglyceride, Fasting 0 - 150 mg/dL 137          Assessment:   ~CAD - stable  STEMI and PCI LAD MANOLO, 2022.  There was a focal 70% residual stenosis of the small caliber apical LAD for which medical therapy was recommended.    ~H/O ischemic cardiomyopathy with recovered LVEF - LVEF 35-40% at time of MI and had improved to 55% on repeat TTE from 7/19/24.    ~Hyperlipidemia - stable  ~Former tobacco use  ~Pulmonary fibrosis   ~Cough due to lung dz  ~SOB - due to lung disease       Plan:  Patient is a former smoker. Educated on never smoking again     ~Stop